# Patient Record
Sex: FEMALE | Race: BLACK OR AFRICAN AMERICAN | Employment: UNEMPLOYED | URBAN - METROPOLITAN AREA
[De-identification: names, ages, dates, MRNs, and addresses within clinical notes are randomized per-mention and may not be internally consistent; named-entity substitution may affect disease eponyms.]

---

## 2018-06-23 ENCOUNTER — HOSPITAL ENCOUNTER (EMERGENCY)
Age: 33
Discharge: HOME | End: 2018-06-23
Attending: EMERGENCY MEDICINE
Payer: SELF-PAY

## 2018-06-23 ENCOUNTER — APPOINTMENT (OUTPATIENT)
Dept: GENERAL RADIOLOGY | Age: 33
End: 2018-06-23
Attending: EMERGENCY MEDICINE
Payer: SELF-PAY

## 2018-06-23 VITALS
WEIGHT: 190 LBS | OXYGEN SATURATION: 100 % | BODY MASS INDEX: 34.96 KG/M2 | HEIGHT: 62 IN | SYSTOLIC BLOOD PRESSURE: 121 MMHG | HEART RATE: 70 BPM | DIASTOLIC BLOOD PRESSURE: 64 MMHG | RESPIRATION RATE: 16 BRPM | TEMPERATURE: 98 F

## 2018-06-23 DIAGNOSIS — R07.89 MUSCULOSKELETAL CHEST PAIN: ICD-10-CM

## 2018-06-23 DIAGNOSIS — R07.89 ATYPICAL CHEST PAIN: Primary | ICD-10-CM

## 2018-06-23 LAB
ALBUMIN SERPL-MCNC: 3.8 G/DL (ref 3.5–5)
ALBUMIN/GLOB SERPL: 0.9 {RATIO} (ref 1.2–3.5)
ALP SERPL-CCNC: 129 U/L (ref 50–136)
ALT SERPL-CCNC: 19 U/L (ref 12–65)
ANION GAP SERPL CALC-SCNC: 9 MMOL/L (ref 7–16)
AST SERPL-CCNC: 18 U/L (ref 15–37)
BASOPHILS # BLD: 0 K/UL (ref 0–0.2)
BASOPHILS NFR BLD: 0 % (ref 0–2)
BILIRUB SERPL-MCNC: 0.5 MG/DL (ref 0.2–1.1)
BUN SERPL-MCNC: 10 MG/DL (ref 6–23)
CALCIUM SERPL-MCNC: 8.9 MG/DL (ref 8.3–10.4)
CHLORIDE SERPL-SCNC: 106 MMOL/L (ref 98–107)
CO2 SERPL-SCNC: 24 MMOL/L (ref 21–32)
CREAT SERPL-MCNC: 0.95 MG/DL (ref 0.6–1)
DIFFERENTIAL METHOD BLD: ABNORMAL
EOSINOPHIL # BLD: 0.1 K/UL (ref 0–0.8)
EOSINOPHIL NFR BLD: 1 % (ref 0.5–7.8)
ERYTHROCYTE [DISTWIDTH] IN BLOOD BY AUTOMATED COUNT: 12.9 % (ref 11.9–14.6)
GLOBULIN SER CALC-MCNC: 4.3 G/DL (ref 2.3–3.5)
GLUCOSE SERPL-MCNC: 97 MG/DL (ref 65–100)
HCG UR QL: NEGATIVE
HCT VFR BLD AUTO: 38.9 % (ref 35.8–46.3)
HGB BLD-MCNC: 13.8 G/DL (ref 11.7–15.4)
IMM GRANULOCYTES # BLD: 0 K/UL (ref 0–0.5)
IMM GRANULOCYTES NFR BLD AUTO: 0 % (ref 0–5)
LYMPHOCYTES # BLD: 4.7 K/UL (ref 0.5–4.6)
LYMPHOCYTES NFR BLD: 47 % (ref 13–44)
MCH RBC QN AUTO: 31.4 PG (ref 26.1–32.9)
MCHC RBC AUTO-ENTMCNC: 35.5 G/DL (ref 31.4–35)
MCV RBC AUTO: 88.6 FL (ref 79.6–97.8)
MONOCYTES # BLD: 0.8 K/UL (ref 0.1–1.3)
MONOCYTES NFR BLD: 7 % (ref 4–12)
NEUTS SEG # BLD: 4.7 K/UL (ref 1.7–8.2)
NEUTS SEG NFR BLD: 45 % (ref 43–78)
PLATELET # BLD AUTO: 258 K/UL (ref 150–450)
PMV BLD AUTO: 10.6 FL (ref 10.8–14.1)
POTASSIUM SERPL-SCNC: 3.8 MMOL/L (ref 3.5–5.1)
PROT SERPL-MCNC: 8.1 G/DL (ref 6.3–8.2)
RBC # BLD AUTO: 4.39 M/UL (ref 4.05–5.25)
SODIUM SERPL-SCNC: 139 MMOL/L (ref 136–145)
TROPONIN I SERPL-MCNC: <0.02 NG/ML (ref 0.02–0.05)
WBC # BLD AUTO: 10.3 K/UL (ref 4.3–11.1)

## 2018-06-23 PROCEDURE — 81003 URINALYSIS AUTO W/O SCOPE: CPT | Performed by: EMERGENCY MEDICINE

## 2018-06-23 PROCEDURE — 74011000250 HC RX REV CODE- 250: Performed by: EMERGENCY MEDICINE

## 2018-06-23 PROCEDURE — 74011250637 HC RX REV CODE- 250/637: Performed by: EMERGENCY MEDICINE

## 2018-06-23 PROCEDURE — 80053 COMPREHEN METABOLIC PANEL: CPT | Performed by: EMERGENCY MEDICINE

## 2018-06-23 PROCEDURE — 84484 ASSAY OF TROPONIN QUANT: CPT | Performed by: EMERGENCY MEDICINE

## 2018-06-23 PROCEDURE — 71045 X-RAY EXAM CHEST 1 VIEW: CPT

## 2018-06-23 PROCEDURE — 93005 ELECTROCARDIOGRAM TRACING: CPT | Performed by: EMERGENCY MEDICINE

## 2018-06-23 PROCEDURE — 85025 COMPLETE CBC W/AUTO DIFF WBC: CPT | Performed by: EMERGENCY MEDICINE

## 2018-06-23 PROCEDURE — 81025 URINE PREGNANCY TEST: CPT

## 2018-06-23 PROCEDURE — 99285 EMERGENCY DEPT VISIT HI MDM: CPT | Performed by: EMERGENCY MEDICINE

## 2018-06-23 RX ORDER — GUAIFENESIN 100 MG/5ML
162 LIQUID (ML) ORAL
Status: COMPLETED | OUTPATIENT
Start: 2018-06-23 | End: 2018-06-23

## 2018-06-23 RX ORDER — RANITIDINE 150 MG/1
150 TABLET, FILM COATED ORAL 2 TIMES DAILY
COMMUNITY
End: 2018-07-13 | Stop reason: ALTCHOICE

## 2018-06-23 RX ORDER — LIDOCAINE HYDROCHLORIDE 20 MG/ML
15 SOLUTION OROPHARYNGEAL
Status: COMPLETED | OUTPATIENT
Start: 2018-06-23 | End: 2018-06-23

## 2018-06-23 RX ORDER — HYOSCYAMINE SULFATE 0.38 MG/1
375 TABLET, EXTENDED RELEASE ORAL
Qty: 60 TAB | Refills: 1 | Status: SHIPPED | OUTPATIENT
Start: 2018-06-23 | End: 2018-07-13 | Stop reason: ALTCHOICE

## 2018-06-23 RX ORDER — TRAMADOL HYDROCHLORIDE 50 MG/1
50-100 TABLET ORAL
Qty: 20 TAB | Refills: 0 | Status: SHIPPED | OUTPATIENT
Start: 2018-06-23 | End: 2018-07-13 | Stop reason: ALTCHOICE

## 2018-06-23 RX ADMIN — ASPIRIN 81 MG 162 MG: 81 TABLET ORAL at 20:03

## 2018-06-23 RX ADMIN — Medication 30 ML: at 20:03

## 2018-06-23 RX ADMIN — LIDOCAINE HYDROCHLORIDE 15 ML: 20 SOLUTION ORAL; TOPICAL at 20:03

## 2018-06-23 NOTE — ED PROVIDER NOTES
Patient is a 34 y.o. female presenting with chest pain. The history is provided by the patient. Chest Pain    This is a new problem. Episode onset: 3-4 hours ago. The problem has not changed since onset. The problem occurs constantly. The pain is associated with normal activity. Pain location: Substernal and slightly parasternal. The pain is mild. The quality of the pain is described as pressure-like. The pain does not radiate. Associated symptoms include cough (Slight, nonproductive) and shortness of breath. Pertinent negatives include no abdominal pain, no back pain, no diaphoresis, no dizziness, no exertional chest pressure, no fever, no headaches, no nausea, no palpitations and no vomiting. She has tried nothing for the symptoms. Risk factors include smoking/tobacco exposure (Patient quit smoking 3 weeks ago). Her past medical history does not include DM or HTN. Pertinent negatives include no exercise treadmill test.       Past Medical History:   Diagnosis Date    Miscarriage     at 4 weeks       Past Surgical History:   Procedure Laterality Date    HX GYN      D&C         Family History:   Problem Relation Age of Onset    Coronary Artery Disease Mother        Social History     Social History    Marital status:      Spouse name: N/A    Number of children: N/A    Years of education: N/A     Occupational History    Not on file. Social History Main Topics    Smoking status: Current Every Day Smoker     Packs/day: 1.00     Years: 20.00     Last attempt to quit: 11/1/2016    Smokeless tobacco: Never Used    Alcohol use Yes      Comment: sometimes    Drug use: Yes     Special: Cocaine      Comment: last use 3 weeks ago    Sexual activity: Yes     Partners: Male     Birth control/ protection: None     Other Topics Concern    Not on file     Social History Narrative         ALLERGIES: Review of patient's allergies indicates no known allergies.     Review of Systems   Constitutional: Negative for chills, diaphoresis and fever. HENT: Negative for rhinorrhea and sore throat. Eyes: Negative for discharge and redness. Respiratory: Positive for cough (Slight, nonproductive) and shortness of breath. Cardiovascular: Positive for chest pain. Negative for palpitations. Gastrointestinal: Negative for abdominal pain, nausea and vomiting. Genitourinary: Negative for difficulty urinating and dysuria. Musculoskeletal: Negative for arthralgias and back pain. Skin: Negative for rash. Neurological: Negative for dizziness and headaches. All other systems reviewed and are negative. Vitals:    06/23/18 1903 06/23/18 1924 06/23/18 1933   BP: (!) 129/95 127/68    Pulse: 73 68    Resp: 16 20    Temp: 98 °F (36.7 °C)     SpO2: 100% 100% 100%   Weight: 86.2 kg (190 lb)     Height: 5' 2\" (1.575 m)              Physical Exam   Constitutional: She is oriented to person, place, and time. She appears well-developed and well-nourished. No distress. HENT:   Head: Normocephalic and atraumatic. Eyes: Conjunctivae are normal. Pupils are equal, round, and reactive to light. Right eye exhibits no discharge. Left eye exhibits no discharge. No scleral icterus. Neck: Normal range of motion. Neck supple. Cardiovascular: Normal rate, regular rhythm and normal heart sounds. Exam reveals no gallop. No murmur heard. Pulmonary/Chest: Effort normal and breath sounds normal. No respiratory distress. She has no wheezes. She has no rales. She exhibits tenderness. Abdominal: Soft. Bowel sounds are normal. There is no tenderness. There is no guarding. Musculoskeletal: Normal range of motion. She exhibits no edema. Neurological: She is alert and oriented to person, place, and time. She exhibits normal muscle tone. cni 2-12 grossly   Skin: Skin is warm and dry. She is not diaphoretic. Psychiatric: She has a normal mood and affect. Her behavior is normal.   Nursing note and vitals reviewed.        MDM  Number of Diagnoses or Management Options  Diagnosis management comments: Medical decision making note:  Atypical chest pain and normal EKG and normal labs  Reproducible by palpation, slightly reproduced by deep inspiration. Long history of reflux, went to an urgent care recently where she was placed on Zantac here in the last week or so. Denies water brash or heartburn symptoms, chest pain, most possible this is a lower level reflex rotating her esophagus without giving her the taste of acid, thanks to the Zantac. .  Trial of GI cocktail, if no improvement will treat for musculoskeletal chest pain and lightheadedness reproducibility with palpation, more so than with inspiratory effort. This concludes the \"medical decision making note\" part of this emergency department visit note.           ED Course       Procedures

## 2018-06-24 LAB
ATRIAL RATE: 71 BPM
CALCULATED P AXIS, ECG09: 67 DEGREES
CALCULATED R AXIS, ECG10: 29 DEGREES
CALCULATED T AXIS, ECG11: 34 DEGREES
DIAGNOSIS, 93000: NORMAL
P-R INTERVAL, ECG05: 160 MS
Q-T INTERVAL, ECG07: 398 MS
QRS DURATION, ECG06: 68 MS
QTC CALCULATION (BEZET), ECG08: 432 MS
VENTRICULAR RATE, ECG03: 71 BPM

## 2018-06-24 NOTE — DISCHARGE INSTRUCTIONS
Stop the Zantac  Start Prilosec 20 cc, once a day  Ultram as needed for chest wall pain  Levbid, twice a day as needed for stomach cramps or esophagus pain    Return to the ER if worse in any way       Chest Pain: Care Instructions  Your Care Instructions    There are many things that can cause chest pain. Some are not serious and will get better on their own in a few days. But some kinds of chest pain need more testing and treatment. Your doctor may have recommended a follow-up visit in the next 8 to 12 hours. If you are not getting better, you may need more tests or treatment. Even though your doctor has released you, you still need to watch for any problems. The doctor carefully checked you, but sometimes problems can develop later. If you have new symptoms or if your symptoms do not get better, get medical care right away. If you have worse or different chest pain or pressure that lasts more than 5 minutes or you passed out (lost consciousness), call 911 or seek other emergency help right away. A medical visit is only one step in your treatment. Even if you feel better, you still need to do what your doctor recommends, such as going to all suggested follow-up appointments and taking medicines exactly as directed. This will help you recover and help prevent future problems. How can you care for yourself at home? · Rest until you feel better. · Take your medicine exactly as prescribed. Call your doctor if you think you are having a problem with your medicine. · Do not drive after taking a prescription pain medicine. When should you call for help? Call 911 if:  ? · You passed out (lost consciousness). ? · You have severe difficulty breathing. ? · You have symptoms of a heart attack. These may include:  ¨ Chest pain or pressure, or a strange feeling in your chest.  ¨ Sweating. ¨ Shortness of breath. ¨ Nausea or vomiting.   ¨ Pain, pressure, or a strange feeling in your back, neck, jaw, or upper belly or in one or both shoulders or arms. ¨ Lightheadedness or sudden weakness. ¨ A fast or irregular heartbeat. After you call 911, the  may tell you to chew 1 adult-strength or 2 to 4 low-dose aspirin. Wait for an ambulance. Do not try to drive yourself. ?Call your doctor today if:  ? · You have any trouble breathing. ? · Your chest pain gets worse. ? · You are dizzy or lightheaded, or you feel like you may faint. ? · You are not getting better as expected. ? · You are having new or different chest pain. Where can you learn more? Go to http://walt-jesus.info/. Enter A120 in the search box to learn more about \"Chest Pain: Care Instructions. \"  Current as of: March 20, 2017  Content Version: 11.4  © 0010-3800 [employer]. Care instructions adapted under license by [employer] (which disclaims liability or warranty for this information). If you have questions about a medical condition or this instruction, always ask your healthcare professional. [address] any warranty or liability for your use of this information.

## 2018-06-24 NOTE — ED NOTES
I have reviewed discharge instructions with the patient. The patient verbalized understanding. Patient left ED via Discharge Method: ambulatory to Home with friend. Opportunity for questions and clarification provided. Patient given 2 scripts. To continue your aftercare when you leave the hospital, you may receive an automated call from our care team to check in on how you are doing. This is a free service and part of our promise to provide the best care and service to meet your aftercare needs.  If you have questions, or wish to unsubscribe from this service please call [phone]. Thank you for Choosing our [facility] Emergency Department.  l

## 2018-08-04 ENCOUNTER — HOSPITAL ENCOUNTER (EMERGENCY)
Age: 33
Discharge: HOME | End: 2018-08-04
Attending: EMERGENCY MEDICINE
Payer: SELF-PAY

## 2018-08-04 ENCOUNTER — APPOINTMENT (OUTPATIENT)
Dept: GENERAL RADIOLOGY | Age: 33
End: 2018-08-04
Attending: EMERGENCY MEDICINE
Payer: SELF-PAY

## 2018-08-04 ENCOUNTER — APPOINTMENT (OUTPATIENT)
Dept: ULTRASOUND IMAGING | Age: 33
End: 2018-08-04
Attending: EMERGENCY MEDICINE
Payer: SELF-PAY

## 2018-08-04 VITALS
HEART RATE: 68 BPM | RESPIRATION RATE: 16 BRPM | TEMPERATURE: 98.4 F | SYSTOLIC BLOOD PRESSURE: 122 MMHG | OXYGEN SATURATION: 100 % | WEIGHT: 190 LBS | BODY MASS INDEX: 33.66 KG/M2 | HEIGHT: 63 IN | DIASTOLIC BLOOD PRESSURE: 74 MMHG

## 2018-08-04 DIAGNOSIS — M79.10 MYALGIA: Primary | ICD-10-CM

## 2018-08-04 PROCEDURE — 73090 X-RAY EXAM OF FOREARM: CPT

## 2018-08-04 PROCEDURE — 93971 EXTREMITY STUDY: CPT

## 2018-08-04 PROCEDURE — 99283 EMERGENCY DEPT VISIT LOW MDM: CPT | Performed by: EMERGENCY MEDICINE

## 2018-08-04 RX ORDER — NAPROXEN 500 MG/1
500 TABLET ORAL 2 TIMES DAILY WITH MEALS
Qty: 20 TAB | Refills: 0 | Status: SHIPPED | OUTPATIENT
Start: 2018-08-04 | End: 2018-08-14

## 2018-08-04 NOTE — ED TRIAGE NOTES
Pt complains of pain from her elbow to her wrist on the right side. States it feels like pins and needles and states it goes numb when she sleeps. Reports this has been going on for about a month. Pt denies injury to the arm. Peripheral pulse is strong and intact.

## 2018-08-05 NOTE — DISCHARGE INSTRUCTIONS
Muscle Aches: Care Instructions  Your Care Instructions    Muscle aches have many possible causes. Some common ones are overuse, tension, and injuries such as a strained muscle. An infection such as the flu can cause muscle aches. Or the aches may be caused by some medicines, such as antipsychotics. Muscle aches may also be a symptom of a disease like lupus or fibromyalgia. Myalgia is the medical term for muscle aches. The doctor will do a physical exam and ask questions to try to find what is causing your pain. You may also have tests such as blood tests or imaging tests like X-rays. These can help find or rule out serious problems. The doctor has checked you carefully, but problems can develop later. If you notice any problems or new symptoms, get medical treatment right away. Follow-up care is a key part of your treatment and safety. Be sure to make and go to all appointments, and call your doctor if you are having problems. It's also a good idea to know your test results and keep a list of the medicines you take. How can you care for yourself at home? · Rest the area that hurts. You may need to stop or reduce the activity that causes your symptoms. Then you can return to it slowly. · Put ice or a cold pack on the area for 10 to 20 minutes at a time to ease pain. Put a thin cloth between the ice and your skin. · Take an over-the-counter pain medicine, such as acetaminophen (Tylenol), ibuprofen (Advil, Motrin), or naproxen (Aleve). Be safe with medicines. Read and follow all instructions on the label. When should you call for help? Call your doctor now or seek immediate medical care if:    · Your pain gets worse.     · You have new symptoms, such as a fever, swelling, or a rash.    Watch closely for changes in your health, and be sure to contact your doctor if:    · You do not get better as expected. Where can you learn more? Go to http://walt-jesus.info/.   Enter G355 in the search box to learn more about \"Muscle Aches: Care Instructions. \"  Current as of: October 9, 2017  Content Version: 11.7  © 7542-8453 [employer]. Care instructions adapted under license by [employer] (which disclaims liability or warranty for this information). If you have questions about a medical condition or this instruction, always ask your healthcare professional. [place] [address] any warranty or liability for your use of this information.

## 2018-08-05 NOTE — ED PROVIDER NOTES
HPI Comments: 35 yo female with no past medical history presents to ED with ~ 1 month of right forearm pain. Patient describes \"pins and needles\" numbing type pain from her elbow to wrist.  The pain is constant with no modifying factors. She states the past 2 days the past has been worse. She also feels like her right forearm is swollen. She denies injury. No upper arm, shoulder, or neck pain. No arm weakness. No arm skin or temperature changes. No history of same. Patient is a 34 y.o. female presenting with arm pain. The history is provided by the patient. Arm Pain Associated symptoms include numbness. Past Medical History:  
Diagnosis Date  Miscarriage   
 at 4 weeks Past Surgical History:  
Procedure Laterality Date  HX GYN    
 D&C Family History:  
Problem Relation Age of Onset  Coronary Artery Disease Mother Social History Social History  Marital status:  Spouse name: N/A  
 Number of children: N/A  
 Years of education: N/A Occupational History  Not on file. Social History Main Topics  Smoking status: Current Every Day Smoker Packs/day: 1.00 Years: 20.00 Last attempt to quit: 11/1/2016  Smokeless tobacco: Never Used  Alcohol use Yes Comment: sometimes  Drug use: Yes Special: Cocaine Comment: last use 3 weeks ago  Sexual activity: Yes  
  Partners: Male Birth control/ protection: None Other Topics Concern  Not on file Social History Narrative ALLERGIES: Review of patient's allergies indicates no known allergies. Review of Systems Constitutional: Negative. HENT: Negative. Eyes: Negative. Respiratory: Negative. Cardiovascular: Negative. Gastrointestinal: Negative. Endocrine: Negative. Genitourinary: Negative. Musculoskeletal: Positive for myalgias. Skin: Negative. Allergic/Immunologic: Negative.    
Neurological: Positive for numbness. Hematological: Negative. Psychiatric/Behavioral: Negative. Vitals:  
 08/04/18 1959 BP: 119/81 Pulse: 70 Resp: 17 Temp: 98.3 °F (36.8 °C) SpO2: 100% Weight: 86.2 kg (190 lb) Height: 5' 3\" (1.6 m) Physical Exam  
Constitutional: She is oriented to person, place, and time. She appears well-developed and well-nourished. No distress. HENT:  
Head: Normocephalic and atraumatic. Eyes: Conjunctivae and EOM are normal.  
Neck: Normal range of motion. Neck supple. No tracheal deviation present. Cardiovascular: Normal rate, regular rhythm and intact distal pulses. Pulses: 
     Radial pulses are 2+ on the right side Pulmonary/Chest: Effort normal and breath sounds normal. No respiratory distress. Abdominal: Soft. There is no tenderness. Musculoskeletal: She exhibits no edema or deformity. Right generalized forearm with tenderness on palpation, no edema appreciated, normal ROM at elbow and wrist without difficulty Neurological: She is alert and oriented to person, place, and time. She has normal strength. No cranial nerve deficit or sensory deficit. No sensory or strength deficit noted in RUE Skin: Skin is warm and dry. No rash noted. She is not diaphoretic. No erythema. No pallor. Psychiatric: She has a normal mood and affect. MDM Number of Diagnoses or Management Options Myalgia:  
Diagnosis management comments: X-ray of right forearm and U/S of RUE negative. Patient with 1 month of right forearm pain. She is neurovascular intact here in ED. Low suspicion for emergent etiology. Will d/c home with NSAID, follow up with PMD and orthopedics as outpatient. ED Course Procedures

## 2018-08-05 NOTE — ED NOTES
I have reviewed discharge instructions with the patient. The patient verbalized understanding. Patient left ED via Discharge Method: ambulatory to Home with self. Opportunity for questions and clarification provided. Patient given 1 scripts. To continue your aftercare when you leave the hospital, you may receive an automated call from our care team to check in on how you are doing. This is a free service and part of our promise to provide the best care and service to meet your aftercare needs.  If you have questions, or wish to unsubscribe from this service please call [phone]. Thank you for Choosing our [facility] Emergency Department.

## 2019-05-25 ENCOUNTER — HOSPITAL ENCOUNTER (EMERGENCY)
Age: 34
Discharge: HOME | End: 2019-05-25
Attending: EMERGENCY MEDICINE
Payer: COMMERCIAL

## 2019-05-25 VITALS
RESPIRATION RATE: 18 BRPM | OXYGEN SATURATION: 99 % | SYSTOLIC BLOOD PRESSURE: 128 MMHG | WEIGHT: 191 LBS | HEART RATE: 74 BPM | BODY MASS INDEX: 33.84 KG/M2 | HEIGHT: 63 IN | TEMPERATURE: 98.3 F | DIASTOLIC BLOOD PRESSURE: 60 MMHG

## 2019-05-25 DIAGNOSIS — N76.0 ACUTE VAGINITIS: Primary | ICD-10-CM

## 2019-05-25 LAB
BACTERIA URNS QL MICRO: ABNORMAL /HPF
CASTS URNS QL MICRO: ABNORMAL /LPF
EPI CELLS #/AREA URNS HPF: ABNORMAL /HPF
HCG UR QL: NEGATIVE
RBC #/AREA URNS HPF: ABNORMAL /HPF
SERVICE CMNT-IMP: NORMAL
WBC URNS QL MICRO: >100 /HPF
WET PREP GENITAL: NORMAL

## 2019-05-25 PROCEDURE — 99284 EMERGENCY DEPT VISIT MOD MDM: CPT | Performed by: EMERGENCY MEDICINE

## 2019-05-25 PROCEDURE — 81003 URINALYSIS AUTO W/O SCOPE: CPT | Performed by: EMERGENCY MEDICINE

## 2019-05-25 PROCEDURE — 81025 URINE PREGNANCY TEST: CPT

## 2019-05-25 PROCEDURE — 87491 CHLMYD TRACH DNA AMP PROBE: CPT

## 2019-05-25 PROCEDURE — 74011250637 HC RX REV CODE- 250/637: Performed by: EMERGENCY MEDICINE

## 2019-05-25 PROCEDURE — 81015 MICROSCOPIC EXAM OF URINE: CPT

## 2019-05-25 PROCEDURE — 74011250636 HC RX REV CODE- 250/636: Performed by: EMERGENCY MEDICINE

## 2019-05-25 PROCEDURE — 87210 SMEAR WET MOUNT SALINE/INK: CPT

## 2019-05-25 RX ORDER — DOXYCYCLINE HYCLATE 100 MG
100 TABLET ORAL 2 TIMES DAILY
Qty: 14 TAB | Refills: 0 | Status: SHIPPED | OUTPATIENT
Start: 2019-05-25 | End: 2019-06-08

## 2019-05-25 RX ORDER — FLUCONAZOLE 150 MG/1
150 TABLET ORAL DAILY
Qty: 1 TAB | Refills: 0 | Status: SHIPPED | OUTPATIENT
Start: 2019-05-25 | End: 2019-05-26

## 2019-05-25 RX ORDER — AZITHROMYCIN 250 MG/1
1000 TABLET, FILM COATED ORAL
Status: COMPLETED | OUTPATIENT
Start: 2019-05-25 | End: 2019-05-25

## 2019-05-25 RX ADMIN — LIDOCAINE HYDROCHLORIDE 250 MG: 10 INJECTION, SOLUTION INFILTRATION; PERINEURAL at 07:20

## 2019-05-25 RX ADMIN — AZITHROMYCIN MONOHYDRATE 1000 MG: 250 TABLET ORAL at 07:19

## 2019-05-25 NOTE — ED PROVIDER NOTES
Patient presents for evaluation with a 24-hour history of vaginal discharge. She describes it as yellow thick and copious. She reports similar occurrence in the past associated with chlamydia infection. She also reports a history of previous gonorrhea infection. She states she is currently in a monogamous relationship. She denies any fever or chills or pelvic pain. The history is provided by the patient. Vaginal Discharge    This is a new problem. The current episode started 12 to 24 hours ago. The problem occurs constantly. The problem has not changed since onset. The discharge occurs spontaneously. The discharge was yellow and thick. She is not pregnant. Pertinent negatives include no fever, no nausea, no vomiting, no dyspareunia, no dysuria, no frequency and no genital burning. She has tried nothing for the symptoms. The treatment provided no relief.         Past Medical History:   Diagnosis Date    Miscarriage     at 4 weeks       Past Surgical History:   Procedure Laterality Date    HX GYN      D&C         Family History:   Problem Relation Age of Onset    Coronary Artery Disease Mother        Social History     Socioeconomic History    Marital status:      Spouse name: Not on file    Number of children: Not on file    Years of education: Not on file    Highest education level: Not on file   Occupational History    Not on file   Social Needs    Financial resource strain: Not on file    Food insecurity:     Worry: Not on file     Inability: Not on file    Transportation needs:     Medical: Not on file     Non-medical: Not on file   Tobacco Use    Smoking status: Current Every Day Smoker     Packs/day: 1.00     Years: 20.00     Pack years: 20.00     Last attempt to quit: 2016     Years since quittin.5    Smokeless tobacco: Never Used   Substance and Sexual Activity    Alcohol use: Yes     Comment: sometimes    Drug use: Yes     Types: Cocaine     Comment: last use 3 weeks ago    Sexual activity: Yes     Partners: Male     Birth control/protection: None   Lifestyle    Physical activity:     Days per week: Not on file     Minutes per session: Not on file    Stress: Not on file   Relationships    Social connections:     Talks on phone: Not on file     Gets together: Not on file     Attends Judaism service: Not on file     Active member of club or organization: Not on file     Attends meetings of clubs or organizations: Not on file     Relationship status: Not on file    Intimate partner violence:     Fear of current or ex partner: Not on file     Emotionally abused: Not on file     Physically abused: Not on file     Forced sexual activity: Not on file   Other Topics Concern    Not on file   Social History Narrative    Not on file         ALLERGIES: Patient has no known allergies. Review of Systems   Constitutional: Negative for fever. Gastrointestinal: Negative for nausea and vomiting. Genitourinary: Positive for vaginal discharge. Negative for dyspareunia, dysuria and frequency. All other systems reviewed and are negative. Vitals:    05/25/19 0556 05/25/19 0621   BP: 122/60    Pulse: 78    Resp: 17    Temp: 98.4 °F (36.9 °C)    SpO2: 98% 100%   Weight: 86.6 kg (191 lb)    Height: 5' 3\" (1.6 m)             Physical Exam   Constitutional: She is oriented to person, place, and time. She appears well-developed and well-nourished. No distress. HENT:   Head: Normocephalic and atraumatic. Eyes: Pupils are equal, round, and reactive to light. Conjunctivae and EOM are normal.   Genitourinary: Vaginal discharge found. Genitourinary Comments: Negative cervical motion tenderness   Neurological: She is alert and oriented to person, place, and time. Skin: Skin is warm and dry. She is not diaphoretic. Psychiatric: She has a normal mood and affect. Her behavior is normal.   Nursing note and vitals reviewed.        MDM  Number of Diagnoses or Management Options  Acute vaginitis:      Amount and/or Complexity of Data Reviewed  Clinical lab tests: ordered and reviewed    Risk of Complications, Morbidity, and/or Mortality  Presenting problems: low  Diagnostic procedures: low  Management options: low    Patient Progress  Patient progress: stable         Procedures

## 2019-05-25 NOTE — ED NOTES
I have reviewed discharge instructions with the patient. The patient verbalized understanding. Patient left ED via Discharge Method: ambulatory to Home with self. Opportunity for questions and clarification provided. Patient given 2 scripts. To continue your aftercare when you leave the hospital, you may receive an automated call from our care team to check in on how you are doing. This is a free service and part of our promise to provide the best care and service to meet your aftercare needs.  If you have questions, or wish to unsubscribe from this service please call [phone]. Thank you for Choosing our [facility] Emergency Department.

## 2019-05-25 NOTE — ED TRIAGE NOTES
Pt complains of light yellow, milky vaginal discharge and itching since last night. Pt denies any other symptoms.

## 2019-05-25 NOTE — DISCHARGE INSTRUCTIONS
Patient Education        Vaginitis: Care Instructions  Your Care Instructions    Vaginitis is soreness or infection of the vagina. This common problem can cause itching and burning. And it can cause a change in vaginal discharge. Sometimes it can cause pain during sex. Vaginitis may be caused by bacteria, yeast, or other germs. Some infections that cause it are caught from a sexual partner. Bath products, spermicides, and douches can irritate the vagina too. Some women have this problem during and after menopause. A drop in estrogen levels during this time can cause dryness, soreness, and pain during sex. Your doctor can give you medicine to treat an infection. And home care may help you feel better. For certain types of infections, your sex partner must be treated too. Follow-up care is a key part of your treatment and safety. Be sure to make and go to all appointments, and call your doctor if you are having problems. It's also a good idea to know your test results and keep a list of the medicines you take. How can you care for yourself at home? · If your doctor prescribed antibiotics, take them as directed. Do not stop taking them just because you feel better. You need to take the full course of antibiotics. · Take your medicines exactly as prescribed. Call your doctor if you think you are having a problem with your medicine. · Do not eat or drink anything that has alcohol if you are taking metronidazole (Flagyl). · If you have a yeast infection, use over-the-counter products as your doctor tells you to. Or take medicine your doctor prescribes exactly as directed. · Wash your vaginal area daily with water. You also can use a mild, unscented soap if you want. · Do not use scented bath products. And do not use vaginal sprays or douches. · Put a washcloth soaked in cool water on the area to relieve itching. Or you can take cool baths.   · If you have dryness because of menopause, use estrogen cream or pills that your doctor prescribes. · Ask your doctor about when it is okay to have sex. · Use a personal lubricant before sex if you have dryness. Examples are Astroglide, K-Y Jelly, and Wet Lubricant Gel. · Ask your doctor if your sex partner also needs treatment. When should you call for help? Call your doctor now or seek immediate medical care if:    · You have a fever and pelvic pain.    Watch closely for changes in your health, and be sure to contact your doctor if:    · You have bleeding other than your period.     · You do not get better as expected. Where can you learn more? Go to http://walt-jesus.info/. Enter X662 in the search box to learn more about \"Vaginitis: Care Instructions. \"  Current as of: May 14, 2018  Content Version: 11.9  © 2594-3949 [employer]. Care instructions adapted under license by [employer] (which disclaims liability or warranty for this information). If you have questions about a medical condition or this instruction, always ask your healthcare professional. [address] any warranty or liability for your use of this information.

## 2019-05-29 LAB
C TRACH RRNA SPEC QL NAA+PROBE: NEGATIVE
N GONORRHOEA RRNA SPEC QL NAA+PROBE: NEGATIVE
SPECIMEN SOURCE: NORMAL

## 2022-05-13 PROBLEM — Z90.710 S/P HYSTERECTOMY: Status: ACTIVE | Noted: 2020-03-11

## 2022-08-15 ENCOUNTER — TELEMEDICINE (OUTPATIENT)
Dept: BEHAVIORAL/MENTAL HEALTH CLINIC | Age: 37
End: 2022-08-15

## 2022-08-15 DIAGNOSIS — F41.0 GENERALIZED ANXIETY DISORDER WITH PANIC ATTACKS: Primary | ICD-10-CM

## 2022-08-15 DIAGNOSIS — F51.01 PRIMARY INSOMNIA: ICD-10-CM

## 2022-08-15 DIAGNOSIS — F41.1 GENERALIZED ANXIETY DISORDER WITH PANIC ATTACKS: Primary | ICD-10-CM

## 2022-08-15 DIAGNOSIS — F33.41 RECURRENT MAJOR DEPRESSIVE DISORDER, IN PARTIAL REMISSION (HCC): ICD-10-CM

## 2022-08-15 PROCEDURE — 99442 PR PHYS/QHP TELEPHONE EVALUATION 11-20 MIN: CPT | Performed by: PSYCHIATRY & NEUROLOGY

## 2022-08-15 RX ORDER — METHOCARBAMOL 500 MG/1
TABLET, FILM COATED ORAL
COMMUNITY
Start: 2022-07-12 | End: 2022-09-14

## 2022-08-15 RX ORDER — GUAIFENESIN 600 MG/1
600 TABLET, EXTENDED RELEASE ORAL 2 TIMES DAILY
COMMUNITY
Start: 2022-07-31 | End: 2022-09-14

## 2022-08-15 RX ORDER — AMOXICILLIN AND CLAVULANATE POTASSIUM 875; 125 MG/1; MG/1
TABLET, FILM COATED ORAL
COMMUNITY
Start: 2022-08-08 | End: 2022-09-14

## 2022-08-15 RX ORDER — FLUTICASONE PROPIONATE 50 MCG
SPRAY, SUSPENSION (ML) NASAL
COMMUNITY
Start: 2022-07-31 | End: 2022-09-14

## 2022-08-15 RX ORDER — IBUPROFEN 800 MG/1
TABLET ORAL
COMMUNITY
Start: 2022-07-31 | End: 2022-08-15

## 2022-08-15 RX ORDER — FLUCONAZOLE 150 MG/1
TABLET ORAL
COMMUNITY
Start: 2022-08-08 | End: 2022-09-14

## 2022-08-15 RX ORDER — TRAZODONE HYDROCHLORIDE 50 MG/1
25-50 TABLET ORAL NIGHTLY
Qty: 30 TABLET | Refills: 2 | Status: SHIPPED | OUTPATIENT
Start: 2022-08-15 | End: 2022-09-14

## 2022-08-15 RX ORDER — CLONAZEPAM 1 MG/1
1 TABLET ORAL DAILY PRN
Qty: 30 TABLET | Refills: 2 | Status: SHIPPED | OUTPATIENT
Start: 2022-08-15 | End: 2022-11-13

## 2022-08-15 RX ORDER — CETIRIZINE HYDROCHLORIDE 10 MG/1
TABLET ORAL
COMMUNITY
Start: 2022-07-31 | End: 2022-09-14

## 2022-08-15 ASSESSMENT — ANXIETY QUESTIONNAIRES
3. WORRYING TOO MUCH ABOUT DIFFERENT THINGS: 3
GAD7 TOTAL SCORE: 12
IF YOU CHECKED OFF ANY PROBLEMS ON THIS QUESTIONNAIRE, HOW DIFFICULT HAVE THESE PROBLEMS MADE IT FOR YOU TO DO YOUR WORK, TAKE CARE OF THINGS AT HOME, OR GET ALONG WITH OTHER PEOPLE: SOMEWHAT DIFFICULT
7. FEELING AFRAID AS IF SOMETHING AWFUL MIGHT HAPPEN: 1
6. BECOMING EASILY ANNOYED OR IRRITABLE: 1
2. NOT BEING ABLE TO STOP OR CONTROL WORRYING: 3
5. BEING SO RESTLESS THAT IT IS HARD TO SIT STILL: 1
1. FEELING NERVOUS, ANXIOUS, OR ON EDGE: 2
4. TROUBLE RELAXING: 1

## 2022-08-15 ASSESSMENT — PATIENT HEALTH QUESTIONNAIRE - PHQ9
SUM OF ALL RESPONSES TO PHQ QUESTIONS 1-9: 5
9. THOUGHTS THAT YOU WOULD BE BETTER OFF DEAD, OR OF HURTING YOURSELF: 0
SUM OF ALL RESPONSES TO PHQ QUESTIONS 1-9: 5
4. FEELING TIRED OR HAVING LITTLE ENERGY: 0
3. TROUBLE FALLING OR STAYING ASLEEP: 2
SUM OF ALL RESPONSES TO PHQ9 QUESTIONS 1 & 2: 3
8. MOVING OR SPEAKING SO SLOWLY THAT OTHER PEOPLE COULD HAVE NOTICED. OR THE OPPOSITE, BEING SO FIGETY OR RESTLESS THAT YOU HAVE BEEN MOVING AROUND A LOT MORE THAN USUAL: 0
7. TROUBLE CONCENTRATING ON THINGS, SUCH AS READING THE NEWSPAPER OR WATCHING TELEVISION: 0
2. FEELING DOWN, DEPRESSED OR HOPELESS: 1
1. LITTLE INTEREST OR PLEASURE IN DOING THINGS: 2
SUM OF ALL RESPONSES TO PHQ QUESTIONS 1-9: 5
5. POOR APPETITE OR OVEREATING: 0
6. FEELING BAD ABOUT YOURSELF - OR THAT YOU ARE A FAILURE OR HAVE LET YOURSELF OR YOUR FAMILY DOWN: 0
SUM OF ALL RESPONSES TO PHQ QUESTIONS 1-9: 5
10. IF YOU CHECKED OFF ANY PROBLEMS, HOW DIFFICULT HAVE THESE PROBLEMS MADE IT FOR YOU TO DO YOUR WORK, TAKE CARE OF THINGS AT HOME, OR GET ALONG WITH OTHER PEOPLE: 1

## 2022-08-15 NOTE — PROGRESS NOTES
Patient:  Michele Longoria  Age:  39 y.o.  :  1985     SEX:  female MRN:  [id number]     RACE: Black /      SEEN:  [x]  PATIENT  []  SPOUSE []  OTHER:              PHQ-9  8/15/2022 2022 2022   Little interest or pleasure in doing things 2 - -   Little interest or pleasure in doing things - 1 1   Feeling down, depressed, or hopeless 1 - -   Trouble falling or staying asleep, or sleeping too much 2 - -   Trouble falling or staying asleep, or sleeping too much - - 1   Feeling tired or having little energy 0 - -   Feeling tired or having little energy - - 1   Poor appetite or overeating 0 - -   Poor appetite, weight loss, or overeating - - 0   Feeling bad about yourself - or that you are a failure or have let yourself or your family down 0 - -   Feeling bad about yourself - or that you are a failure or have let yourself or your family down - - 0   Trouble concentrating on things, such as reading the newspaper or watching television 0 - -   Trouble concentrating on things such as school, work, reading, or watching TV - - 0   Moving or speaking so slowly that other people could have noticed. Or the opposite - being so fidgety or restless that you have been moving around a lot more than usual 0 - -   Moving or speaking so slowly that other people could have noticed; or the opposite being so fidgety that others notice - - 0   Thoughts that you would be better off dead, or of hurting yourself in some way 0 - -   Thoughts of being better off dead, or hurting yourself in some way - - 0   PHQ-2 Score 3 - -   Total Score PHQ 2 - 2 2   PHQ-9 Total Score 5 - -   PHQ 9 Score - - 4   If you checked off any problems, how difficult have these problems made it for you to do your work, take care of things at home, or get along with other people?  1 - -   How difficult have these problems made it for you to do your work, take care of your home and get along with others - - Not difficult at all       PENG-7 SCREENING 8/15/2022 2/2/2022 10/13/2021   Feeling nervous, anxious, or on edge More than half the days - -   Not being able to stop or control worrying Nearly every day - -   Worrying too much about different things Nearly every day - -   Trouble relaxing Several days - -   Being so restless that it is hard to sit still Several days - -   Becoming easily annoyed or irritable Several days - -   Feeling afraid as if something awful might happen Several days - -   PENG-7 Total Score 12 - -   How difficult have these problems made it for you to do your work, take care of things at home, or get along with other people? Somewhat difficult Not difficult at all Not difficult at all   Feeling nervous, anxious, or on edge - Several days Several days   PENG-7 Total Score - 6 5        I was at home while conducting this encounter. Consent:  She and/or her healthcare decision maker is aware that this patient-initiated Telehealth encounter is a billable service, with coverage as determined by her insurance carrier. She is aware that she may receive a bill and has provided verbal consent to proceed: YesPatient identification was verified, and a caregiver was present when appropriate. The patient was located in a state where the provider was credentialed to provide care. This virtual visit was conducted telephone encounter only. -  I affirm this is a Patient Initiated Episode with an Established Patient who has not had a related appointment within my department in the past 7 days or scheduled within the next 24 hours. Note: this encounter is not billable if this call serves to triage the patient into an appointment for the relevant concern. Total Time: minutes: 11-20 minutes. Chief complaint:  Pt says she has been okay on Klonopin. Subjective:  Phone visit. States doing okay on Klonopin. Is off from work today. Mother has a doctor's appointment so she took off. Not sleeping good.   Takes melatonin but it does not work. Still  from her  and probably will get . She does not want to deal with his alcohol use. Gets along with her other family okay. Her mother states 2 miles away from her. Supportive psychotherapy provided. She denies suicidal ideation/homicidal ideations. Denies symptoms psychosis. Patient Active Problem List   Diagnosis    GERD (gastroesophageal reflux disease)    S/P hysterectomy     S/p hysterectomy    Denies palpitation,SOB, Chest pain, headaches. In no acute distress. MEDICATION REVIEW:    Current Medications:    Current Outpatient Medications   Medication Sig    amoxicillin-clavulanate (AUGMENTIN) 875-125 MG per tablet TAKE ONE TABLET BY MOUTH TWO (TWO) TIMES A DAY FOR SEVEN DAYS    cetirizine (ZYRTEC) 10 MG tablet TAKE 1 TABLET BY MOUTH EVERY DAY. fluticasone (FLONASE) 50 MCG/ACT nasal spray USE 1 SPRAY INTO EACH NOSTRIL DAILY    guaiFENesin (MUCINEX) 600 MG extended release tablet Take 600 mg by mouth in the morning and 600 mg before bedtime. methocarbamol (ROBAXIN) 500 MG tablet TAKE ONE TABLET (500 MG) BY MOUTH THREE (THREE) TIMES A DAY AS NEEDED FOR MUSCLE SPASMS    traZODone (DESYREL) 50 MG tablet Take 0.5-1 tablets by mouth nightly    clonazePAM (KLONOPIN) 1 MG tablet Take 1 tablet by mouth daily as needed for Anxiety for up to 90 days. ibuprofen (ADVIL;MOTRIN) 400 MG tablet Take 400 mg by mouth every 6 hours as needed    metroNIDAZOLE (FLAGYL) 500 MG tablet Take 500 mg by mouth as needed    omeprazole (PRILOSEC) 40 MG delayed release capsule Take 40 mg by mouth daily    fluconazole (DIFLUCAN) 150 MG tablet TAKE ONE PILL EVERY THREE DAYS (Patient not taking: Reported on 8/15/2022)     No current facility-administered medications for this visit.        Allergies   Allergen Reactions    Lorazepam Itching and Swelling       Past Medical History, Past Surgical History, Family history, Social History, and Medications were all reviewed with the patient today and updated as necessary.      Compliant with medication: Yes   Side effects from medications:  No     EXAMINATION  Musculoskeletal    GAIT AND STATION   [] WNL   [] RESTRICTED   [] UNSTEADY WALK        [] ABNORMAL   [] UNBALANCED         PSYCHIATRIC     GENERAL APPEARANCE:   []  WELL GROOMED []     DISHEVELED   []  UNKEMPT      []  UNUSUAL/BIZZARE    [] WNL       ATTITUDE:   [x] COOPERATIVE   [] GUARDED   [] SUSPICIOUS      [] HOSTILE                            BEHAVIOR:   [x] CALM   [] HYPERACTIVE   [] MANNERISMS      [] BIZZARE         SPEECH:   [x] NORMAL FOR CLIENT   [] SPONTANEOUS   [] SLURRED   [] WHISPERING      [] LOUD   [] PRESSURED   [] ARTICULATE       EYE CONTACT:   [] WNL   [] BLANK STARE   [] INTENSE      [] AVOIDANT         MOOD:   [] EUTHYMIC   [x] ANXIOUS   [] DEPRESSED      [] IRRITABLE   [] ANGRY   [] APATHETIC     AFFECT:   [] CONGRUENT WITH MOOD   [] FLAT   [] CONSTRICTED      [] INAPPROPRIATE   [] LABILE           THOUGHT PROCESS:   [x] LOGICAL/GOAL-DIRECTED   [] FOI   [] CIRCUMSANTIAL      [] INCOHERENT   [] TANGENTIAL   [] CONCRETE      [] PERSEVERATION           THOUGHT CONTENT:                DELUSIONS  [x] DENIES  [] GRANDIOSE  [] PERSECUTORY  [] Scientology  [] REFERENCE   HALLUCINATIONS  [x] DENIES  [] AUDITORY  [] VISUAL  [] OLFACTORY  [] TACTILE     [] GUSTATORY  [] SOMATIC         OBSESSIONS  [x] DENIES  [] PRESENT         SUICIDAL IDEATION  [] DENIES  [] PRESENT W/O PLAN  [] PRESENT W/ PLAN       HOMICIDAL IDEATION  [x] DENIES  [] PRESENT W/O PLAN  [] PRESENT W/ PLAN           JUDGEMENT:   [x] GOOD   [] FAIR   [] POOR   INSIGHT:   [x] GOOD   [] FAIR   [] POOR     COGNITION:           SENSORIUM:   [x] ALERT   [] CLOUDED   [] DROWSY     ORIENTATION:   [x] INTACT   [] TIME:  PLACE  PERSON   RECENT & REMOTE MEMORY:   [] NORMAL   [x] OTHER:                  ATTENTION:   [x] INTACT   [] MILD IMPAIRMENT   [] SEVERE IMPAIRMENT     CONCENTRATION:   [x] INTACT [] MILD IMPAIRMENT   [] SEVERE IMPAIRMENT     LANGUAGE:   [x] AVERAGE   [] ABOVE AVERAGE   [] BELOW AVERAGE     FUND OF KNOWLEDGE:   [] UNABLE TO ASSESS AT THIS TIME   [x] AVERAGE   [] ABOVE AVERAGE   [] BELOW AVERAGE      [] GOOD TO EXCELLENT KNOWLEDGE OF CURRENT EVENTS   [] POOR TO NO KNLEDGE OF CURRENT EVENTS           ABNORMAL MOVEMENTS:   [] NONE   [] TICS   [] TREMORS   [] BIZZARE      [] FACE   [] TRUNK   [] EXTREMETIES   [] GESTURES        SLEEP:   [] GOOD   [] FAIR   [x] POOR      MUSCLE STRENGTH AND TONE   [] WNL   [] ATROPHY   [] SPASTIC        [] FLACCID   [] COGWHEEL         Diagnoses/Impressions:    ICD-10-CM    1. Generalized anxiety disorder with panic attacks  F41.1     F41.0       2. Recurrent major depressive disorder, in partial remission ([facility] 75.)  F33.41       3.  Primary insomnia  F51.01           TREATMENT GOALS:  Symptom reduction, Medication adherence, maintain therapeutic gains    LABS/IMAGING:    []  Ordered [x]  Reviewed []  New Labs Ordered:     LAB  WBC   Date/Time Value Ref Range Status   02/23/2022 04:37 PM 10.4 3.4 - 10.8 x10E3/uL Final     Hemoglobin   Date/Time Value Ref Range Status   02/23/2022 04:37 PM 12.2 11.1 - 15.9 g/dL Final     Hematocrit   Date/Time Value Ref Range Status   02/23/2022 04:37 PM 36.0 34.0 - 46.6 % Final     Platelets   Date/Time Value Ref Range Status   02/23/2022 04:37  150 - 450 x10E3/uL Final     Sodium   Date/Time Value Ref Range Status   02/23/2022 04:37  134 - 144 mmol/L Final     Potassium   Date/Time Value Ref Range Status   02/23/2022 04:37 PM 4.3 3.5 - 5.2 mmol/L Final     Chloride   Date/Time Value Ref Range Status   02/23/2022 04:37  96 - 106 mmol/L Final     CO2   Date/Time Value Ref Range Status   02/23/2022 04:37 PM 22 20 - 29 mmol/L Final     BUN   Date/Time Value Ref Range Status   02/23/2022 04:37 PM 11 6 - 20 mg/dL Final     Magnesium   Date/Time Value Ref Range Status   02/23/2022 04:37 PM 1.8 1.6 - 2.3 mg/dL Final     ALT Date/Time Value Ref Range Status   02/23/2022 04:37 PM 11 0 - 32 IU/L Final     AST   Date/Time Value Ref Range Status   02/23/2022 04:37 PM 16 0 - 40 IU/L Final     TSH   Date/Time Value Ref Range Status   02/23/2022 04:37 PM 1.010 0.450 - 4.500 uIU/mL Final       Please refer to the lab tab in the epic and care everywhere for the most recent lab results. Plan:     [x]  Medication ordered: Trazodone and Klonopin to target depression, anxiety, insomnia. [x]  Medication education/counseling provided  Medication dosage and time to take, purpose/expected benefits/risks, common side effects, lab monitoring required and reason, expected length of treatment, risk of no treatment, effects on pregnancy/nursing, financial availability. Educated patient on  side effects/risks/benefits of meds including cardiac arrhythmias, suicidal ideations, orthostatic hypotension, serotonin syndrome, risk of duncan/hypomania from antidepressants, withdrawals from abrupt discontinuation of meds,  risk of bleeding, risk of seizures, addiction potential, memory impairment with long term use of benzos, respiratory depression, high blood pressure, dizziness, drowsiness, sedation , risk of falls, Risk & benefits discussed: including but not limited to possible off-label medication usage. [x] Follow MSE for sxs improvement     I have reviewed the patients controlled substance prescription history, as maintained in the [place] prescription monitoring program, so that the prescription(s) for a  controlled substance can be given. Recommendations and Referrals: Follow up with : MD, requires monitoring of response to medication, requires monitoring of medication side effects.     Time until next PMA:     Follow up with Mental Health Clinicians: psychotherapy interventions, improve level of functioning, monitoring to prevent decompensation /hospitalization, monitoring to maintain therapeutic gains, symptoms (resolving and controlled)           Psychotherapy note:                                __10_ Minutes of psychotherapy     [x]  Supportive psychotherapy, Patient discussed certain situational and personal stressors ongoing in her life at this time, weight management d/w the patient. Sleep hygiene d/w patient. Patient allowed to vent out her emotions. Scenarios were reviewed using role playing and CBT techniques in order to increase insight and decrease anxiety. []  Disposition planning      []  Dangerous and will not contract for safety in the community    **Pateint has been notified: They are to call 911 or go to their nearest E.R. if they are experiencing a medical emergency or suicidal ideations/homicidal ideations. **  All ancillary documentation entered reviewed by provider. PLEASE NOTE:  This document has been produced in part or whole using voice recognition software. Proofread however unrecognized errors in transcription may be present.         Mikhail Drummond MD

## 2022-08-22 RX ORDER — OMEPRAZOLE 40 MG/1
CAPSULE, DELAYED RELEASE ORAL
Qty: 90 CAPSULE | OUTPATIENT
Start: 2022-08-22

## 2022-09-13 LAB
ALBUMIN SERPL-MCNC: 3.3 G/DL (ref 3.5–5)
ALBUMIN/GLOB SERPL: 0.8 {RATIO} (ref 1.2–3.5)
ALP SERPL-CCNC: 89 U/L (ref 50–136)
ALT SERPL-CCNC: 14 U/L (ref 12–65)
ANION GAP SERPL CALC-SCNC: 5 MMOL/L (ref 4–13)
AST SERPL-CCNC: 14 U/L (ref 15–37)
BILIRUB SERPL-MCNC: 0.3 MG/DL (ref 0.2–1.1)
BUN SERPL-MCNC: 15 MG/DL (ref 6–23)
CALCIUM SERPL-MCNC: 8.6 MG/DL (ref 8.3–10.4)
CHLORIDE SERPL-SCNC: 109 MMOL/L (ref 101–110)
CO2 SERPL-SCNC: 26 MMOL/L (ref 21–32)
CREAT SERPL-MCNC: 1.02 MG/DL (ref 0.6–1)
ERYTHROCYTE [DISTWIDTH] IN BLOOD BY AUTOMATED COUNT: 12.8 % (ref 11.9–14.6)
GLOBULIN SER CALC-MCNC: 4 G/DL (ref 2.3–3.5)
GLUCOSE SERPL-MCNC: 104 MG/DL (ref 65–100)
HCT VFR BLD AUTO: 37.3 % (ref 35.8–46.3)
HGB BLD-MCNC: 12.7 G/DL (ref 11.7–15.4)
MCH RBC QN AUTO: 30.9 PG (ref 26.1–32.9)
MCHC RBC AUTO-ENTMCNC: 34 G/DL (ref 31.4–35)
MCV RBC AUTO: 90.8 FL (ref 79.6–97.8)
NRBC # BLD: 0 K/UL (ref 0–0.2)
PLATELET # BLD AUTO: 252 K/UL (ref 150–450)
PMV BLD AUTO: 10.5 FL (ref 9.4–12.3)
POTASSIUM SERPL-SCNC: 3.4 MMOL/L (ref 3.5–5.1)
PROT SERPL-MCNC: 7.3 G/DL (ref 6.3–8.2)
RBC # BLD AUTO: 4.11 M/UL (ref 4.05–5.2)
SODIUM SERPL-SCNC: 140 MMOL/L (ref 136–145)
TROPONIN I SERPL HS-MCNC: 3.1 PG/ML (ref 0–14)
WBC # BLD AUTO: 10.1 K/UL (ref 4.3–11.1)

## 2022-09-13 PROCEDURE — 84484 ASSAY OF TROPONIN QUANT: CPT

## 2022-09-13 PROCEDURE — 93005 ELECTROCARDIOGRAM TRACING: CPT | Performed by: EMERGENCY MEDICINE

## 2022-09-13 PROCEDURE — 96374 THER/PROPH/DIAG INJ IV PUSH: CPT

## 2022-09-13 PROCEDURE — 80053 COMPREHEN METABOLIC PANEL: CPT

## 2022-09-13 PROCEDURE — 99285 EMERGENCY DEPT VISIT HI MDM: CPT

## 2022-09-13 PROCEDURE — 85027 COMPLETE CBC AUTOMATED: CPT

## 2022-09-13 ASSESSMENT — PAIN - FUNCTIONAL ASSESSMENT: PAIN_FUNCTIONAL_ASSESSMENT: 0-10

## 2022-09-13 ASSESSMENT — PAIN SCALES - GENERAL: PAINLEVEL_OUTOF10: 9

## 2022-09-14 ENCOUNTER — HOSPITAL ENCOUNTER (EMERGENCY)
Dept: GENERAL RADIOLOGY | Age: 37
Discharge: HOME | End: 2022-09-17
Payer: COMMERCIAL

## 2022-09-14 ENCOUNTER — OFFICE VISIT (OUTPATIENT)
Dept: FAMILY MEDICINE CLINIC | Facility: CLINIC | Age: 37
End: 2022-09-14
Payer: COMMERCIAL

## 2022-09-14 ENCOUNTER — HOSPITAL ENCOUNTER (EMERGENCY)
Age: 37
Discharge: HOME | End: 2022-09-14
Attending: EMERGENCY MEDICINE
Payer: COMMERCIAL

## 2022-09-14 VITALS
TEMPERATURE: 98.2 F | OXYGEN SATURATION: 99 % | WEIGHT: 207 LBS | DIASTOLIC BLOOD PRESSURE: 79 MMHG | SYSTOLIC BLOOD PRESSURE: 111 MMHG | HEIGHT: 63 IN | BODY MASS INDEX: 36.68 KG/M2 | HEART RATE: 72 BPM

## 2022-09-14 VITALS
SYSTOLIC BLOOD PRESSURE: 105 MMHG | DIASTOLIC BLOOD PRESSURE: 63 MMHG | OXYGEN SATURATION: 98 % | HEART RATE: 69 BPM | WEIGHT: 200 LBS | BODY MASS INDEX: 35.44 KG/M2 | HEIGHT: 63 IN | TEMPERATURE: 97.9 F | RESPIRATION RATE: 18 BRPM

## 2022-09-14 DIAGNOSIS — M62.838 MUSCLE SPASM: ICD-10-CM

## 2022-09-14 DIAGNOSIS — M94.0 COSTOCHONDRITIS, ACUTE: ICD-10-CM

## 2022-09-14 DIAGNOSIS — E87.6 HYPOKALEMIA: Primary | ICD-10-CM

## 2022-09-14 DIAGNOSIS — R07.89 ATYPICAL CHEST PAIN: Primary | ICD-10-CM

## 2022-09-14 DIAGNOSIS — Z23 ENCOUNTER FOR IMMUNIZATION: ICD-10-CM

## 2022-09-14 LAB
EKG ATRIAL RATE: 73 BPM
EKG DIAGNOSIS: NORMAL
EKG P AXIS: 73 DEGREES
EKG P-R INTERVAL: 182 MS
EKG Q-T INTERVAL: 412 MS
EKG QRS DURATION: 72 MS
EKG QTC CALCULATION (BAZETT): 453 MS
EKG R AXIS: 51 DEGREES
EKG T AXIS: 44 DEGREES
EKG VENTRICULAR RATE: 73 BPM
TROPONIN I SERPL HS-MCNC: 3.1 PG/ML (ref 0–14)

## 2022-09-14 PROCEDURE — 84484 ASSAY OF TROPONIN QUANT: CPT

## 2022-09-14 PROCEDURE — 99214 OFFICE O/P EST MOD 30 MIN: CPT | Performed by: NURSE PRACTITIONER

## 2022-09-14 PROCEDURE — 90471 IMMUNIZATION ADMIN: CPT | Performed by: NURSE PRACTITIONER

## 2022-09-14 PROCEDURE — 90674 CCIIV4 VAC NO PRSV 0.5 ML IM: CPT | Performed by: NURSE PRACTITIONER

## 2022-09-14 PROCEDURE — 6370000000 HC RX 637 (ALT 250 FOR IP): Performed by: EMERGENCY MEDICINE

## 2022-09-14 PROCEDURE — 6360000002 HC RX W HCPCS: Performed by: EMERGENCY MEDICINE

## 2022-09-14 PROCEDURE — 96374 THER/PROPH/DIAG INJ IV PUSH: CPT

## 2022-09-14 PROCEDURE — 71046 X-RAY EXAM CHEST 2 VIEWS: CPT

## 2022-09-14 RX ORDER — MAGNESIUM HYDROXIDE/ALUMINUM HYDROXICE/SIMETHICONE 120; 1200; 1200 MG/30ML; MG/30ML; MG/30ML
30 SUSPENSION ORAL
Status: COMPLETED | OUTPATIENT
Start: 2022-09-14 | End: 2022-09-14

## 2022-09-14 RX ORDER — KETOROLAC TROMETHAMINE 15 MG/ML
15 INJECTION, SOLUTION INTRAMUSCULAR; INTRAVENOUS ONCE
Status: COMPLETED | OUTPATIENT
Start: 2022-09-14 | End: 2022-09-14

## 2022-09-14 RX ORDER — TIZANIDINE 2 MG/1
2 TABLET ORAL NIGHTLY PRN
Qty: 10 TABLET | Refills: 0 | Status: SHIPPED | OUTPATIENT
Start: 2022-09-14

## 2022-09-14 RX ORDER — NAPROXEN 500 MG/1
500 TABLET ORAL 2 TIMES DAILY
Qty: 30 TABLET | Refills: 0 | Status: SHIPPED | OUTPATIENT
Start: 2022-09-14

## 2022-09-14 RX ORDER — LIDOCAINE HYDROCHLORIDE 20 MG/ML
15 SOLUTION OROPHARYNGEAL
Status: COMPLETED | OUTPATIENT
Start: 2022-09-14 | End: 2022-09-14

## 2022-09-14 RX ORDER — OMEPRAZOLE 40 MG/1
40 CAPSULE, DELAYED RELEASE ORAL DAILY
Qty: 30 CAPSULE | Refills: 2 | Status: SHIPPED | OUTPATIENT
Start: 2022-09-14

## 2022-09-14 RX ADMIN — ALUMINUM HYDROXIDE, MAGNESIUM HYDROXIDE, DIMETHICONE 30 ML: 200; 200; 20 LIQUID ORAL at 01:24

## 2022-09-14 RX ADMIN — KETOROLAC TROMETHAMINE 15 MG: 15 INJECTION, SOLUTION INTRAMUSCULAR; INTRAVENOUS at 00:39

## 2022-09-14 RX ADMIN — LIDOCAINE HYDROCHLORIDE 15 ML: 20 SOLUTION ORAL; TOPICAL at 01:24

## 2022-09-14 ASSESSMENT — PATIENT HEALTH QUESTIONNAIRE - PHQ9
8. MOVING OR SPEAKING SO SLOWLY THAT OTHER PEOPLE COULD HAVE NOTICED. OR THE OPPOSITE, BEING SO FIGETY OR RESTLESS THAT YOU HAVE BEEN MOVING AROUND A LOT MORE THAN USUAL: 1
3. TROUBLE FALLING OR STAYING ASLEEP: 0
SUM OF ALL RESPONSES TO PHQ QUESTIONS 1-9: 9
1. LITTLE INTEREST OR PLEASURE IN DOING THINGS: 2
9. THOUGHTS THAT YOU WOULD BE BETTER OFF DEAD, OR OF HURTING YOURSELF: 0
6. FEELING BAD ABOUT YOURSELF - OR THAT YOU ARE A FAILURE OR HAVE LET YOURSELF OR YOUR FAMILY DOWN: 0
SUM OF ALL RESPONSES TO PHQ9 QUESTIONS 1 & 2: 4
2. FEELING DOWN, DEPRESSED OR HOPELESS: 2
7. TROUBLE CONCENTRATING ON THINGS, SUCH AS READING THE NEWSPAPER OR WATCHING TELEVISION: 1
SUM OF ALL RESPONSES TO PHQ QUESTIONS 1-9: 9
SUM OF ALL RESPONSES TO PHQ QUESTIONS 1-9: 9
4. FEELING TIRED OR HAVING LITTLE ENERGY: 3
5. POOR APPETITE OR OVEREATING: 0
SUM OF ALL RESPONSES TO PHQ QUESTIONS 1-9: 9
10. IF YOU CHECKED OFF ANY PROBLEMS, HOW DIFFICULT HAVE THESE PROBLEMS MADE IT FOR YOU TO DO YOUR WORK, TAKE CARE OF THINGS AT HOME, OR GET ALONG WITH OTHER PEOPLE: 1

## 2022-09-14 ASSESSMENT — ENCOUNTER SYMPTOMS
FACIAL SWELLING: 0
EYES NEGATIVE: 1
ABDOMINAL PAIN: 0
ALLERGIC/IMMUNOLOGIC NEGATIVE: 1
VOMITING: 0
RESPIRATORY NEGATIVE: 1
GASTROINTESTINAL NEGATIVE: 1
DIARRHEA: 0
SHORTNESS OF BREATH: 0
COUGH: 1

## 2022-09-14 NOTE — PROGRESS NOTES
History     Socioeconomic History    Marital status:      Spouse name: Not on file    Number of children: Not on file    Years of education: Not on file    Highest education level: Not on file   Occupational History    Not on file   Tobacco Use    Smoking status: Former     Packs/day: 1.00     Types: Cigarettes     Quit date: 2016     Years since quittin.8    Smokeless tobacco: Never   Vaping Use    Vaping Use: Never used   Substance and Sexual Activity    Alcohol use: Not Currently    Drug use: Not Currently     Types: Cocaine    Sexual activity: Yes     Birth control/protection: Surgical, None   Other Topics Concern    Not on file   Social History Narrative    Not on file     Social Determinants of Health     Financial Resource Strain: Not on file   Food Insecurity: Not on file   Transportation Needs: Not on file   Physical Activity: Not on file   Stress: Not on file   Social Connections: Not on file   Intimate Partner Violence: Not on file   Housing Stability: Not on file       OB History    No obstetric history on file.          Past Surgical History:   Procedure Laterality Date    DILATION AND CURETTAGE OF UTERUS      GYN      D&C    HYSTERECTOMY (CERVIX STATUS UNKNOWN)  2020    TUBAL LIGATION         Health Maintenance   Topic Date Due    Varicella vaccine (1 of 2 - 2-dose childhood series) Never done    HIV screen  Never done    DTaP/Tdap/Td vaccine (6 - Tdap) 2001    Flu vaccine (1) Never done    COVID-19 Vaccine (3 - Booster for Pfizer series) 2022    Depression Monitoring  08/15/2023    Diabetes screen  2025    Hepatitis B vaccine  Completed    Hib vaccine  Completed    Hepatitis C screen  Completed    Hepatitis A vaccine  Aged Out    Meningococcal (ACWY) vaccine  Aged Out    Pneumococcal 0-64 years Vaccine  Aged Out         Current Outpatient Medications:     omeprazole (PRILOSEC) 40 MG delayed release capsule, Take 1 capsule by mouth daily, Disp: 30 capsule, Rfl: 2 tiZANidine (ZANAFLEX) 2 MG tablet, Take 1 tablet by mouth nightly as needed (spasms), Disp: 10 tablet, Rfl: 0    clonazePAM (KLONOPIN) 1 MG tablet, Take 1 tablet by mouth daily as needed for Anxiety for up to 90 days. , Disp: 30 tablet, Rfl: 2    naproxen (NAPROSYN) 500 MG tablet, Take 1 tablet by mouth 2 times daily (Patient not taking: Reported on 9/14/2022), Disp: 30 tablet, Rfl: 0    cetirizine (ZYRTEC) 10 MG tablet, TAKE 1 TABLET BY MOUTH EVERY DAY., Disp: , Rfl:     fluconazole (DIFLUCAN) 150 MG tablet, TAKE ONE PILL EVERY THREE DAYS (Patient not taking: Reported on 8/15/2022), Disp: , Rfl:     fluticasone (FLONASE) 50 MCG/ACT nasal spray, USE 1 SPRAY INTO EACH NOSTRIL DAILY, Disp: , Rfl:     guaiFENesin (MUCINEX) 600 MG extended release tablet, Take 600 mg by mouth in the morning and 600 mg before bedtime. , Disp: , Rfl:     methocarbamol (ROBAXIN) 500 MG tablet, TAKE ONE TABLET (500 MG) BY MOUTH THREE (THREE) TIMES A DAY AS NEEDED FOR MUSCLE SPASMS, Disp: , Rfl:     Review of Systems   Constitutional: Negative. HENT: Negative. Eyes: Negative. Respiratory: Negative. Cardiovascular: Negative. Gastrointestinal: Negative. Endocrine: Negative. Genitourinary: Negative. Musculoskeletal:         Chest wall pain   Skin: Negative. Allergic/Immunologic: Negative. Neurological: Negative. Hematological: Negative. Psychiatric/Behavioral: Negative. Vitals:    09/14/22 1442   BP: 111/79   Pulse: 72   Temp: 98.2 °F (36.8 °C)   SpO2: 99%        Physical Exam  Constitutional:       Appearance: Normal appearance. HENT:      Head: Normocephalic. Nose: Nose normal.   Eyes:      Extraocular Movements: Extraocular movements intact. Pupils: Pupils are equal, round, and reactive to light. Cardiovascular:      Rate and Rhythm: Normal rate and regular rhythm. Pulmonary:      Effort: Pulmonary effort is normal.      Breath sounds: Normal breath sounds.    Abdominal:      General: Abdomen is flat. Palpations: Abdomen is soft. Musculoskeletal:         General: Tenderness (chest wall tenderness with palpation and deep inspiration) present. Cervical back: Normal range of motion and neck supple. Skin:     General: Skin is warm and dry. Neurological:      General: No focal deficit present. Mental Status: She is alert and oriented to person, place, and time. Psychiatric:         Mood and Affect: Mood normal.         Behavior: Behavior normal.          Assessment & Plan:    1. Hypokalemia  - Potassium; Future  - Potassium    2. Costochondritis, acute  - tiZANidine (ZANAFLEX) 2 MG tablet; Take 1 tablet by mouth nightly as needed (spasms)  Dispense: 10 tablet; Refill: 0  Continue Naprosyn bid prn pain    3. Muscle spasm  - tiZANidine (ZANAFLEX) 2 MG tablet; Take 1 tablet by mouth nightly as needed (spasms)  Dispense: 10 tablet; Refill: 0    4. Encounter for immunization  - Influenza, FLUCELVAX, (age 10 mo+), IM, Preservative Free, 0.5 mL      Greater than 50% counseling and/or coordination of care: the treatment regimen is extensive; detailed review. Will notify of lab. Recheck in 3 months, sooner for issues. This note was dictated using dragon voice recognition software. It has been proofread, but there may still exist voice recognition errors that the author did not detect.       Signed By: BAL Ibanez - CNP     September 14, 2022

## 2022-09-14 NOTE — ED TRIAGE NOTES
Pt reports chest pain starting this morning when waking up. Pt states the pains are sharp and shooting. Denies any other sx, denies past cardiac history.  Pt endorses hx of anxiety, pt states she took a Klonopin around 8pm.

## 2022-09-14 NOTE — ED NOTES
I have reviewed discharge instructions with the patient. The patient verbalized understanding. Patient left ED via Discharge Method: ambulatory to Home with spouse. Opportunity for questions and clarification provided. Patient given 1 scripts. To continue your aftercare when you leave the hospital, you may receive an automated call from our care team to check in on how you are doing. This is a free service and part of our promise to provide the best care and service to meet your aftercare needs.  If you have questions, or wish to unsubscribe from this service please call [phone]. Thank you for Choosing our [facility] Emergency Department.         Brent Grigsby RN  09/14/22 3130

## 2022-09-15 LAB — POTASSIUM SERPL-SCNC: 4 MMOL/L (ref 3.5–5.1)

## 2022-11-21 RX ORDER — OMEPRAZOLE 40 MG/1
CAPSULE, DELAYED RELEASE ORAL
Qty: 30 CAPSULE | Refills: 2 | OUTPATIENT
Start: 2022-11-21

## 2022-12-05 RX ORDER — OMEPRAZOLE 40 MG/1
40 CAPSULE, DELAYED RELEASE ORAL DAILY
Qty: 30 CAPSULE | Refills: 2 | OUTPATIENT
Start: 2022-12-05

## 2022-12-15 ENCOUNTER — NURSE TRIAGE (OUTPATIENT)
Dept: OTHER | Facility: CLINIC | Age: 37
End: 2022-12-15

## 2022-12-15 NOTE — TELEPHONE ENCOUNTER
Location of patient: [place]    Received call from Agusto Lloyd at [employer] with [employer]. Subjective: Caller states \"I started a new job Monday and I stand on my feet all day. My L foot started hurting. The second day I could barely walk. My hands and feet are swollen. I also have a knot on the side of my L foot and I don't know where it came from. \"     Current Symptoms: hand and feet swelling     Onset: 4 days ago; sudden    Associated Symptoms:  denies numbness, tingling or discoloration    Pain Severity: 8/10; dull, aching; constant    Temperature: NA     What has been tried: ibuprofen    LMP: NA Pregnant:  hysterectomy    Recommended disposition: Go to [facility]    Care advice provided, patient verbalizes understanding; denies any other questions or concerns; instructed to call back for any new or worsening symptoms. Patient/Caller agrees with recommended disposition; writer provided warm transfer to [facility] at [employer] for appointment scheduling    Attention Provider: Thank you for allowing me to participate in the care of your patient. The patient was connected to triage in response to information provided to the ECC/PSC. Please do not respond through this encounter as the response is not directed to a shared pool.     Reason for Disposition   Swelling of face, arm or hands  (Exception: Slight puffiness of fingers during hot weather.)    Protocols used: Leg Swelling and Edema-ADULT-[place]

## 2022-12-20 ENCOUNTER — OFFICE VISIT (OUTPATIENT)
Dept: FAMILY MEDICINE CLINIC | Facility: CLINIC | Age: 37
End: 2022-12-20
Payer: COMMERCIAL

## 2022-12-20 ENCOUNTER — HOSPITAL ENCOUNTER (OUTPATIENT)
Dept: GENERAL RADIOLOGY | Age: 37
Discharge: HOME | End: 2022-12-23
Payer: COMMERCIAL

## 2022-12-20 VITALS
HEART RATE: 91 BPM | TEMPERATURE: 98.8 F | BODY MASS INDEX: 36.96 KG/M2 | SYSTOLIC BLOOD PRESSURE: 102 MMHG | DIASTOLIC BLOOD PRESSURE: 73 MMHG | OXYGEN SATURATION: 96 % | WEIGHT: 208.6 LBS | HEIGHT: 63 IN

## 2022-12-20 DIAGNOSIS — M25.472 LEFT ANKLE SWELLING: ICD-10-CM

## 2022-12-20 DIAGNOSIS — R53.83 OTHER FATIGUE: ICD-10-CM

## 2022-12-20 DIAGNOSIS — R60.9 EDEMA, UNSPECIFIED TYPE: Primary | ICD-10-CM

## 2022-12-20 DIAGNOSIS — R60.9 EDEMA, UNSPECIFIED TYPE: ICD-10-CM

## 2022-12-20 DIAGNOSIS — R06.00 DYSPNEA, UNSPECIFIED TYPE: ICD-10-CM

## 2022-12-20 LAB
BASOPHILS # BLD: 0 K/UL (ref 0–0.2)
BASOPHILS NFR BLD: 1 % (ref 0–2)
DIFFERENTIAL METHOD BLD: NORMAL
EOSINOPHIL # BLD: 0.1 K/UL (ref 0–0.8)
EOSINOPHIL NFR BLD: 1 % (ref 0.5–7.8)
ERYTHROCYTE [DISTWIDTH] IN BLOOD BY AUTOMATED COUNT: 13 % (ref 11.9–14.6)
HCT VFR BLD AUTO: 38.6 % (ref 35.8–46.3)
HGB BLD-MCNC: 13 G/DL (ref 11.7–15.4)
IMM GRANULOCYTES # BLD AUTO: 0 K/UL (ref 0–0.5)
IMM GRANULOCYTES NFR BLD AUTO: 0 % (ref 0–5)
LYMPHOCYTES # BLD: 2.8 K/UL (ref 0.5–4.6)
LYMPHOCYTES NFR BLD: 32 % (ref 13–44)
MCH RBC QN AUTO: 31.2 PG (ref 26.1–32.9)
MCHC RBC AUTO-ENTMCNC: 33.7 G/DL (ref 31.4–35)
MCV RBC AUTO: 92.6 FL (ref 82–102)
MONOCYTES # BLD: 0.7 K/UL (ref 0.1–1.3)
MONOCYTES NFR BLD: 7 % (ref 4–12)
NEUTS SEG # BLD: 5.2 K/UL (ref 1.7–8.2)
NEUTS SEG NFR BLD: 59 % (ref 43–78)
NRBC # BLD: 0 K/UL (ref 0–0.2)
PLATELET # BLD AUTO: 237 K/UL (ref 150–450)
PMV BLD AUTO: 11.7 FL (ref 9.4–12.3)
RBC # BLD AUTO: 4.17 M/UL (ref 4.05–5.2)
WBC # BLD AUTO: 8.8 K/UL (ref 4.3–11.1)

## 2022-12-20 PROCEDURE — 73600 X-RAY EXAM OF ANKLE: CPT

## 2022-12-20 PROCEDURE — 99203 OFFICE O/P NEW LOW 30 MIN: CPT | Performed by: NURSE PRACTITIONER

## 2022-12-20 ASSESSMENT — PATIENT HEALTH QUESTIONNAIRE - PHQ9
SUM OF ALL RESPONSES TO PHQ QUESTIONS 1-9: 0
1. LITTLE INTEREST OR PLEASURE IN DOING THINGS: 0
SUM OF ALL RESPONSES TO PHQ9 QUESTIONS 1 & 2: 0
SUM OF ALL RESPONSES TO PHQ QUESTIONS 1-9: 0
2. FEELING DOWN, DEPRESSED OR HOPELESS: 0

## 2022-12-20 ASSESSMENT — ENCOUNTER SYMPTOMS
EYES NEGATIVE: 1
GASTROINTESTINAL NEGATIVE: 1
RESPIRATORY NEGATIVE: 1
ALLERGIC/IMMUNOLOGIC NEGATIVE: 1

## 2022-12-20 NOTE — PROGRESS NOTES
[address]  [address] [place] [address], [address] [facility]   (ph) [phone] (fax) [phone]  Yulia SELBY, FNP-C      Chief Complaint   Patient presents with    Foot Swelling     Bilateral foot swelling for about 8 days, pain level 6       43 yo female comes in c/o bilateral feet swelling. Gained 1 lb since . She is under care of cardiology and denies a hx of CHF. She has not been using her compression stockings and reports occasional SOB; however,not often. She recently noticed a knot on her left ankle and reports it is tender to touch.     Foot Swelling       Allergies   Allergen Reactions    Lorazepam Itching and Swelling       Past Medical History:   Diagnosis Date    Anxiety     GERD (gastroesophageal reflux disease)     GERD (gastroesophageal reflux disease)     Miscarriage     at 4 weeks       Family History   Problem Relation Age of Onset    Hypertension Maternal Grandmother     No Known Problems Sister     No Known Problems Brother     No Known Problems Brother     Hypertension Mother     No Known Problems Brother     No Known Problems Sister     Cancer Father     Prostate Cancer Father     No Known Problems Brother        Social History     Socioeconomic History    Marital status:      Spouse name: Not on file    Number of children: Not on file    Years of education: Not on file    Highest education level: Not on file   Occupational History    Not on file   Tobacco Use    Smoking status: Former     Packs/day: 1.00     Types: Cigarettes     Quit date: 2016     Years since quittin.1    Smokeless tobacco: Never   Vaping Use    Vaping Use: Never used   Substance and Sexual Activity    Alcohol use: Not Currently    Drug use: Not Currently     Types: Cocaine    Sexual activity: Yes     Birth control/protection: Surgical, None   Other Topics Concern    Not on file   Social History Narrative    Not on file     Social Determinants of Health Financial Resource Strain: Not on file   Food Insecurity: Not on file   Transportation Needs: Not on file   Physical Activity: Not on file   Stress: Not on file   Social Connections: Not on file   Intimate Partner Violence: Not on file   Housing Stability: Not on file       OB History    No obstetric history on file. Past Surgical History:   Procedure Laterality Date    DILATION AND CURETTAGE OF UTERUS      GYN      D&C    HYSTERECTOMY (CERVIX STATUS UNKNOWN)  03/2020    TUBAL LIGATION         Health Maintenance   Topic Date Due    Varicella vaccine (1 of 2 - 2-dose childhood series) Never done    HIV screen  Never done    DTaP/Tdap/Td vaccine (6 - Tdap) 02/03/2001    COVID-19 Vaccine (3 - Booster for Pfizer series) 08/18/2022    Depression Monitoring  12/20/2023    Diabetes screen  03/04/2025    Hib vaccine  Completed    Flu vaccine  Completed    Hepatitis C screen  Completed    Hepatitis A vaccine  Aged Out    Meningococcal (ACWY) vaccine  Aged Out    Pneumococcal 0-64 years Vaccine  Aged Out         Current Outpatient Medications:     naproxen (NAPROSYN) 500 MG tablet, Take 1 tablet by mouth 2 times daily (Patient not taking: Reported on 9/14/2022), Disp: 30 tablet, Rfl: 0    omeprazole (PRILOSEC) 40 MG delayed release capsule, Take 1 capsule by mouth daily, Disp: 30 capsule, Rfl: 2    tiZANidine (ZANAFLEX) 2 MG tablet, Take 1 tablet by mouth nightly as needed (spasms), Disp: 10 tablet, Rfl: 0    clonazePAM (KLONOPIN) 1 MG tablet, Take 1 tablet by mouth daily as needed for Anxiety for up to 90 days. , Disp: 30 tablet, Rfl: 2    Review of Systems   Constitutional: Negative. HENT: Negative. Eyes: Negative. Respiratory: Negative. Cardiovascular:  Positive for leg swelling. Gastrointestinal: Negative. Endocrine: Negative. Genitourinary: Negative. Musculoskeletal:         Left ankle pain   Skin: Negative. Allergic/Immunologic: Negative. Neurological: Negative. Hematological: Negative. Psychiatric/Behavioral: Negative. Vitals:    12/20/22 1136   BP: 102/73   Pulse: 91   Temp: 98.8 °F (37.1 °C)   SpO2: 96%        Physical Exam  Constitutional:       Appearance: Normal appearance. HENT:      Head: Normocephalic. Nose: Nose normal.   Eyes:      Extraocular Movements: Extraocular movements intact. Pupils: Pupils are equal, round, and reactive to light. Cardiovascular:      Rate and Rhythm: Normal rate and regular rhythm. Pulmonary:      Effort: Pulmonary effort is normal.      Breath sounds: Normal breath sounds. Abdominal:      General: Abdomen is flat. Palpations: Abdomen is soft. Musculoskeletal:         General: Tenderness (left ankle) present. Normal range of motion. Cervical back: Normal range of motion and neck supple. Right lower leg: Edema (1+) present. Left lower leg: Edema (1+) present. Skin:     General: Skin is warm and dry. Neurological:      General: No focal deficit present. Mental Status: She is alert and oriented to person, place, and time. Psychiatric:         Mood and Affect: Mood normal.         Behavior: Behavior normal.        PHQ-9=0      Assessment & Plan:    1. Edema, unspecified type  Stable; compression stockings encouraged  - Comprehensive Metabolic Panel; Future    2. Left ankle swelling  - XR ANKLE LEFT (2 VIEWS); Future    3. Other fatigue  - CBC with Auto Differential; Future    4. Dyspnea, unspecified type  - Brain Natriuretic Peptide; Future      Greater than 50% counseling and/or coordination of care: the treatment regimen is extensive; detailed review. Will notify of labs. F/U in 1 week. This note was dictated using dragon voice recognition software. It has been proofread, but there may still exist voice recognition errors that the author did not detect.       Signed By: BAL Clemons - CNP     December 20, 2022

## 2022-12-21 DIAGNOSIS — R79.89 ELEVATED SERUM CREATININE: Primary | ICD-10-CM

## 2022-12-21 LAB
ALBUMIN SERPL-MCNC: 3.5 G/DL (ref 3.5–5)
ALBUMIN/GLOB SERPL: 0.9 (ref 0.4–1.6)
ALP SERPL-CCNC: 93 U/L (ref 50–136)
ALT SERPL-CCNC: 20 U/L (ref 12–65)
ANION GAP SERPL CALC-SCNC: 9 MMOL/L (ref 2–11)
AST SERPL-CCNC: 16 U/L (ref 15–37)
BILIRUB SERPL-MCNC: 0.3 MG/DL (ref 0.2–1.1)
BUN SERPL-MCNC: 14 MG/DL (ref 6–23)
CALCIUM SERPL-MCNC: 9.2 MG/DL (ref 8.3–10.4)
CHLORIDE SERPL-SCNC: 111 MMOL/L (ref 101–110)
CO2 SERPL-SCNC: 20 MMOL/L (ref 21–32)
CREAT SERPL-MCNC: 1.2 MG/DL (ref 0.6–1)
GLOBULIN SER CALC-MCNC: 4.1 G/DL (ref 2.8–4.5)
GLUCOSE SERPL-MCNC: 105 MG/DL (ref 65–100)
NT PRO BNP: 18 PG/ML (ref 5–125)
POTASSIUM SERPL-SCNC: 4.2 MMOL/L (ref 3.5–5.1)
PROT SERPL-MCNC: 7.6 G/DL (ref 6.3–8.2)
SODIUM SERPL-SCNC: 140 MMOL/L (ref 133–143)

## 2022-12-21 NOTE — RESULT ENCOUNTER NOTE
Results to patient please. BNP was normal.  Glucose is 105. Creatinine was 1.20. Please encourage to avoid NSAIDs and remain hydrated. Recheck in 2 to 3 weeks.   Liver enzymes normal.  Keep scheduled follow-up

## 2022-12-23 RX ORDER — OMEPRAZOLE 40 MG/1
CAPSULE, DELAYED RELEASE ORAL
Qty: 30 CAPSULE | Refills: 2 | Status: SHIPPED | OUTPATIENT
Start: 2022-12-23

## 2023-01-13 DIAGNOSIS — R79.89 ELEVATED SERUM CREATININE: ICD-10-CM

## 2023-01-13 LAB
ALBUMIN SERPL-MCNC: 3.3 G/DL (ref 3.5–5)
ALBUMIN/GLOB SERPL: 0.9 (ref 0.4–1.6)
ALP SERPL-CCNC: 87 U/L (ref 50–136)
ALT SERPL-CCNC: 21 U/L (ref 12–65)
ANION GAP SERPL CALC-SCNC: 5 MMOL/L (ref 2–11)
AST SERPL-CCNC: 16 U/L (ref 15–37)
BILIRUB SERPL-MCNC: 0.6 MG/DL (ref 0.2–1.1)
BUN SERPL-MCNC: 10 MG/DL (ref 6–23)
CALCIUM SERPL-MCNC: 9.8 MG/DL (ref 8.3–10.4)
CHLORIDE SERPL-SCNC: 108 MMOL/L (ref 101–110)
CO2 SERPL-SCNC: 26 MMOL/L (ref 21–32)
CREAT SERPL-MCNC: 0.8 MG/DL (ref 0.6–1)
GLOBULIN SER CALC-MCNC: 3.8 G/DL (ref 2.8–4.5)
GLUCOSE SERPL-MCNC: 103 MG/DL (ref 65–100)
POTASSIUM SERPL-SCNC: 3.8 MMOL/L (ref 3.5–5.1)
PROT SERPL-MCNC: 7.1 G/DL (ref 6.3–8.2)
SODIUM SERPL-SCNC: 139 MMOL/L (ref 133–143)

## 2023-01-18 ENCOUNTER — OFFICE VISIT (OUTPATIENT)
Dept: FAMILY MEDICINE CLINIC | Facility: CLINIC | Age: 38
End: 2023-01-18
Payer: COMMERCIAL

## 2023-01-18 VITALS
DIASTOLIC BLOOD PRESSURE: 77 MMHG | HEIGHT: 63 IN | SYSTOLIC BLOOD PRESSURE: 114 MMHG | HEART RATE: 92 BPM | OXYGEN SATURATION: 93 % | BODY MASS INDEX: 37.17 KG/M2 | TEMPERATURE: 98.3 F | WEIGHT: 209.8 LBS

## 2023-01-18 DIAGNOSIS — M25.572 ACUTE LEFT ANKLE PAIN: Primary | ICD-10-CM

## 2023-01-18 DIAGNOSIS — K21.9 GASTROESOPHAGEAL REFLUX DISEASE WITHOUT ESOPHAGITIS: ICD-10-CM

## 2023-01-18 PROCEDURE — 99213 OFFICE O/P EST LOW 20 MIN: CPT | Performed by: NURSE PRACTITIONER

## 2023-01-18 RX ORDER — MELOXICAM 7.5 MG/1
7.5 TABLET ORAL DAILY
Qty: 30 TABLET | Refills: 0 | Status: SHIPPED | OUTPATIENT
Start: 2023-01-18

## 2023-01-18 RX ORDER — OMEPRAZOLE 40 MG/1
40 CAPSULE, DELAYED RELEASE ORAL DAILY
Qty: 30 CAPSULE | Refills: 2 | Status: SHIPPED | OUTPATIENT
Start: 2023-01-18

## 2023-01-18 ASSESSMENT — PATIENT HEALTH QUESTIONNAIRE - PHQ9
5. POOR APPETITE OR OVEREATING: 3
1. LITTLE INTEREST OR PLEASURE IN DOING THINGS: 2
6. FEELING BAD ABOUT YOURSELF - OR THAT YOU ARE A FAILURE OR HAVE LET YOURSELF OR YOUR FAMILY DOWN: 1
9. THOUGHTS THAT YOU WOULD BE BETTER OFF DEAD, OR OF HURTING YOURSELF: 0
SUM OF ALL RESPONSES TO PHQ9 QUESTIONS 1 & 2: 4
SUM OF ALL RESPONSES TO PHQ QUESTIONS 1-9: 19
3. TROUBLE FALLING OR STAYING ASLEEP: 3
7. TROUBLE CONCENTRATING ON THINGS, SUCH AS READING THE NEWSPAPER OR WATCHING TELEVISION: 2
SUM OF ALL RESPONSES TO PHQ QUESTIONS 1-9: 19
SUM OF ALL RESPONSES TO PHQ QUESTIONS 1-9: 19
10. IF YOU CHECKED OFF ANY PROBLEMS, HOW DIFFICULT HAVE THESE PROBLEMS MADE IT FOR YOU TO DO YOUR WORK, TAKE CARE OF THINGS AT HOME, OR GET ALONG WITH OTHER PEOPLE: 1
8. MOVING OR SPEAKING SO SLOWLY THAT OTHER PEOPLE COULD HAVE NOTICED. OR THE OPPOSITE, BEING SO FIGETY OR RESTLESS THAT YOU HAVE BEEN MOVING AROUND A LOT MORE THAN USUAL: 3
4. FEELING TIRED OR HAVING LITTLE ENERGY: 3
SUM OF ALL RESPONSES TO PHQ QUESTIONS 1-9: 19
2. FEELING DOWN, DEPRESSED OR HOPELESS: 2

## 2023-01-18 ASSESSMENT — ENCOUNTER SYMPTOMS
RESPIRATORY NEGATIVE: 1
GASTROINTESTINAL NEGATIVE: 1
ALLERGIC/IMMUNOLOGIC NEGATIVE: 1
EYES NEGATIVE: 1

## 2023-01-18 NOTE — PROGRESS NOTES
[address]  [address] [facility]  [place], [address] [facility]   (ph) [phone] (fax) [phone]  Yulia SELBY, FNP-C      Chief Complaint   Patient presents with    Follow-up     4 month fu    Medication Refill       45 yo female comes in as a follow-up to left foot pain/swelling. She reports that it is worsening rather than improving. Last xray showed  mild soft tissue swelling. No acute osteoarticular process. She is under care of cardiology and denies a hx of CHF. She has not been using her compression stockings daily. She still has a  small knot on her left ankle and reports it is tender to touch; however, is improving.         Medication Refill      Allergies   Allergen Reactions    Lorazepam Itching and Swelling       Past Medical History:   Diagnosis Date    Anxiety     GERD (gastroesophageal reflux disease)     GERD (gastroesophageal reflux disease)     Miscarriage     at 4 weeks       Family History   Problem Relation Age of Onset    Hypertension Maternal Grandmother     No Known Problems Sister     No Known Problems Brother     No Known Problems Brother     Hypertension Mother     No Known Problems Brother     No Known Problems Sister     Cancer Father     Prostate Cancer Father     No Known Problems Brother        Social History     Socioeconomic History    Marital status:      Spouse name: Not on file    Number of children: Not on file    Years of education: Not on file    Highest education level: Not on file   Occupational History    Not on file   Tobacco Use    Smoking status: Former     Packs/day: 1.00     Types: Cigarettes     Quit date: 2016     Years since quittin.2    Smokeless tobacco: Never   Vaping Use    Vaping Use: Never used   Substance and Sexual Activity    Alcohol use: Not Currently    Drug use: Not Currently     Types: Cocaine    Sexual activity: Yes     Birth control/protection: Surgical, None   Other Topics Concern Not on file   Social History Narrative    Not on file     Social Determinants of Health     Financial Resource Strain: Not on file   Food Insecurity: Not on file   Transportation Needs: Not on file   Physical Activity: Not on file   Stress: Not on file   Social Connections: Not on file   Intimate Partner Violence: Not on file   Housing Stability: Not on file       OB History    No obstetric history on file. Past Surgical History:   Procedure Laterality Date    DILATION AND CURETTAGE OF UTERUS      GYN      D&C    HYSTERECTOMY (CERVIX STATUS UNKNOWN)  03/2020    TUBAL LIGATION         Health Maintenance   Topic Date Due    Varicella vaccine (1 of 2 - 2-dose childhood series) Never done    HIV screen  Never done    DTaP/Tdap/Td vaccine (6 - Tdap) 02/03/2001    COVID-19 Vaccine (3 - Booster for Pfizer series) 08/18/2022    Depression Monitoring  12/20/2023    Diabetes screen  03/04/2025    Hib vaccine  Completed    Flu vaccine  Completed    Hepatitis C screen  Completed    Hepatitis A vaccine  Aged Out    Meningococcal (ACWY) vaccine  Aged Out    Pneumococcal 0-64 years Vaccine  Aged Out         Current Outpatient Medications:     omeprazole (PRILOSEC) 40 MG delayed release capsule, Take 1 capsule by mouth daily, Disp: 30 capsule, Rfl: 2    meloxicam (MOBIC) 7.5 MG tablet, Take 1 tablet by mouth daily, Disp: 30 tablet, Rfl: 0    clonazePAM (KLONOPIN) 1 MG tablet, Take 1 tablet by mouth daily as needed for Anxiety for up to 90 days. , Disp: 30 tablet, Rfl: 2    Review of Systems   Constitutional: Negative. HENT: Negative. Eyes: Negative. Respiratory: Negative. Cardiovascular: Negative. Gastrointestinal: Negative. Endocrine: Negative. Genitourinary: Negative. Musculoskeletal:         Left ankle pain   Skin: Negative. Allergic/Immunologic: Negative. Neurological: Negative. Hematological: Negative. Psychiatric/Behavioral: Negative.         Vitals:    01/18/23 1617   BP: 114/77 Pulse: 92   Temp: 98.3 °F (36.8 °C)   SpO2: 93%        Physical Exam  Constitutional:       Appearance: Normal appearance. HENT:      Head: Normocephalic. Nose: Nose normal.   Eyes:      Extraocular Movements: Extraocular movements intact. Pupils: Pupils are equal, round, and reactive to light. Cardiovascular:      Rate and Rhythm: Normal rate and regular rhythm. Pulmonary:      Effort: Pulmonary effort is normal.      Breath sounds: Normal breath sounds. Abdominal:      General: Abdomen is flat. Palpations: Abdomen is soft. Musculoskeletal:         General: Tenderness (left ankle) present. Cervical back: Normal range of motion and neck supple. Skin:     General: Skin is warm and dry. Neurological:      General: No focal deficit present. Mental Status: She is alert and oriented to person, place, and time. Psychiatric:         Mood and Affect: Mood normal.         Behavior: Behavior normal.        PHQ-9=0      Assessment & Plan:    1. Acute left ankle pain  Pt requesting to see ortho  - meloxicam (MOBIC) 7.5 MG tablet; Take 1 tablet by mouth daily  Dispense: 30 tablet; Refill: 0  - [address] [place] [address]    2. Gastroesophageal reflux disease without esophagitis  stable  - omeprazole (PRILOSEC) 40 MG delayed release capsule; Take 1 capsule by mouth daily  Dispense: 30 capsule; Refill: 2      Greater than 50% counseling and/or coordination of care. F/U prn. This note was dictated using dragon voice recognition software. It has been proofread, but there may still exist voice recognition errors that the author did not detect.       Signed By: Joel Chacko, APRN - CNP     January 18, 2023

## 2023-02-03 ENCOUNTER — OFFICE VISIT (OUTPATIENT)
Dept: ORTHOPEDIC SURGERY | Age: 38
End: 2023-02-03

## 2023-02-03 VITALS — BODY MASS INDEX: 36.86 KG/M2 | HEIGHT: 63 IN | WEIGHT: 208 LBS

## 2023-02-03 DIAGNOSIS — M25.572 LEFT ANKLE PAIN, UNSPECIFIED CHRONICITY: Primary | ICD-10-CM

## 2023-02-03 DIAGNOSIS — M21.6X2 ACQUIRED PLANOVALGUS DEFORMITY OF LEFT FOOT: ICD-10-CM

## 2023-02-03 DIAGNOSIS — M76.822 POSTERIOR TIBIAL TENDINITIS OF LEFT LOWER EXTREMITY: ICD-10-CM

## 2023-02-03 NOTE — PROGRESS NOTES
Name: Melly Smalls  YOB: 1985  Gender: female  MRN: [id number]     CC: Left ankle pain    HPI:   She reports years of swelling and pain in her left medial ankle/foot  Late Fall 2022: She reports increased left medial ankle and foot pain  01/09/2023: Mr. Brie Thomas, [place], prescribed Medrol and PT  01/18/2023: Ms. Michael Cagle, APRN-CNP prescribed Mobic  02/03/2023: Presents with left medial ankle/foot pain. Unable to work more than 5 hours. ROS/Meds/PSH/PMH/FH/SH: reviewed today    Tobacco:  reports that she quit smoking about 6 years ago. Her smoking use included cigarettes. She smoked an average of 1 pack per day. She has never used smokeless tobacco.     Physical Examination:  Patient appears to be alert and oriented with acceptable appearance. No obvious distress or SOB  CV: appears to have acceptable vascular color and capillary refill  Neuro: appears to have mostly intact light touch sensation   Skin: Left medial PTT area soft tissue thickening  MS: Standing: Significant planovalgus: Left DSHR weak; unable to do Left SSHR: Gait protected  Left = insertional PTT pain; no ATT pain; no anterior medial ankle pain  Left = breaks with resistance PTT stress testing so really hard to assess full PTT strength    XR: Left side: Standing AP lateral mortise ankle plus AP oblique foot taken today with bunion deformity; mild tarsometatarsal arthritis; moderate planovalgus  XR Impression:  As above      Reviewed Test/Records/Documents:  12/20/2022: Left ankle x-ray [facility]: Radiologic impression: Mild soft tissue swelling. No acute osteoarticular process the x-rays of the 2 months on which she notes when doing the weekend ankle and foot on the [address] okay so then they must Synemol yet is only gotten 2 views of her there is okay  01/09/2023: Brie Thomas PA: Reflects chronic left foot pain with no injury. Off-and-on foot pain for years. Diagnosed planovalgus deformity and tendinitis. Prescribed Medrol Dosepak  01/18/2023: Mrs. Michael Cagle, APRN-CNP: Reflects left foot pain with small knot left ankle diagnosing left acute ankle pain and prescribed Mobic     Injection: Not applicable    Assessment:    Left planovalgus, posterior tibial tendinitis, mild PTT deficiency    Plan:   The patient and I discussed the above assessment. We explored treatment options. I believe her deficiency on exam relates to breaking during stress testing due to pain  Hopefully with bracing, out of work and natural medication she will see improvement  Will MRI scan to ensure no surgical pathology    Advanced medical imaging: Left ankle/hindfoot MRI scan: Assess for insertional posterior tibial tendon tearing    DME: Reparel sleeve: Lace up ankle brace  We discussed ankle care and brace protection  PT: Her co-pay was too much for her to afford PT  Orthotic/prosthetic: : More likely custom full-length insoles       Medication - OTC meds prn: Prescribed: Boswellia, Devil's claw, Turmeric-curcumin   Magne sports topical rub with frankincense and myrrh      Surgical discussion: We spent time today discussing potential outcome of surgery. Discussed that due to her planovalgus, isolated PTT reconstruction is not an option without planovalgus reconstruction. We will know more after MRI scan  Follow up: After MRI scan  Work status: Out of work 4 weeks    This note was created using Dragon voice recognition software which may result in errors of speech and spelling recognition and word/phrase syntax errors.

## 2023-02-03 NOTE — LETTER
[facility]  [address] [place] [id number]  Phone: [phone]  Fax: [phone]    Jackson Humphreys MD        February 3, 2023     Patient: Robbi Mars   YOB: 1985   Date of Visit: 2/3/2023       To Whom It May Concern: It is my medical opinion that Jamar Penn Work Status : Out of work for 4 weeks. If you have any questions or concerns, please don't hesitate to call.     Sincerely,        Jackson Humphreys MD

## 2023-02-03 NOTE — LETTER
DME Patient Authorization Form    Name: Brandee Gaspar  : 1985  MRN: [id number]   Age: 44 y.o. Gender: female  Delivery Address: [facility]     Diagnosis:     ICD-10-CM    1. Left ankle pain, unspecified chronicity  M25.572 XR ANKLE LEFT (MIN 3 VIEWS)     XR FOOT LEFT (2 VIEWS)     Wraptor Ankle Brace ()     Reparel Ankle Sleeve ()      2. Posterior tibial tendinitis of left lower extremity  M76.822 Wraptor Ankle Brace ()     Reparel Ankle Sleeve ()      3. Acquired planovalgus deformity of left foot  M21.6X2 Wraptor Ankle Brace ()     Reparel Ankle Sleeve ()           Requested DME:  Reparel Ankle Sleeve**AANKL ($30) X 1 - left  Wraptor Ankle Brace - -70 ($129.00) X 1 - left        Clinical Notes:     **Indicates non-covered items by insurance. Payment expected on date of service. Electronically signed by  Provider: Fany Mohr MD__Date: 2/3/2023                            [facility] Tax ID # [id number]        Durable Medical Equipment and/or Orthotics Patient Consent     I understand that my physician has prescribed this medical supply as part of my treatment plan as a matter of Medical Necessity.  I understand that I have a choice in where I receive my prescribed orthopedic supplies and/or services.  I authorize [facility] to furnish this service/product and to provide my insurance carrier with any information requested in order to process for payment.  I instruct my insurance carrier to pay [facility] directly for these services/products.  I understand that my insurance carrier may deny payment for this supply because it is a non-covered item, deemed not medically necessary or considered experimental.   I understand that any cost not covered by my insurance carrier will be solely my financial responsibility.    I have received the Supplier Standards and have reviewed them.  I have received the prescribed item and have been fully instructed on the proper use of the above services/products.    ______ (Patient Initials) I understand that all DME items are non-returnable after being dispensed. Items still in sealed packaging may be returned up to 14 days after purchasing. [address] will replace items that are defective.    ______ (Patient Initials) I understand that [facility] will not file a claim with my insurance carrier for this service/product and I am waiving my right to file a claim on my own for this service/product with my insurance company as this item is NON-COVERED (Denoted by the **) by my Insurance company/policy. ______ (Patient Initials) I understand that I am responsible to bring my equipment to the hospital for any surgery. ______________________________________________  ________________________  Patient / Alton Patiño            Thank you for considering [address] Your physician has prescribed specific medical equipment or devices for your home use. The following describes your rights and responsibilities as our customer. Right to Choose Providers: You have a choice regarding which company supplies your home medical equipment and devices, and to consult your physician in this decision. You may choose a medical supply store, a home medical equipment provider, or a specialist such as [facility]. [facility] will coordinate with your physician to provide the medical equipment or devices prescribed for your home use. Right to Service:  You have the right to considerate, respectful and nondiscriminatory care. You have the right to receive accurate and easily understood information about your health care.   If you speak a foreign language, or don't understand the discussions, assistance will be provided to allow you to make informed health care decisions. You have the right to know your treatment options and to participate in decisions about your care, including the right to accept or refuse treatment. You have the right to expect a reasonable response to your requests for treatment or service. You have the right to talk in confidence with health care providers and to have your health care information protected. You have the right to receive an explanation of your bill. You have the right to complain about the service or product you receive. Patient Responsibilities:  Please provide complete and accurate information about your health insurance benefits and make arrangements for the timely payment of your bill. [facility] will, if possible, assume responsibility for billing your insurance (Medicare, Medicaid and commercial) for the prescribed equipment or devices. If your policy does not cover the prescribed product, or only covers a portion of the bill, you are responsible for any remaining balance. Return and Exchange Policy:  [facility] will honor published  Warranties for products. [facility] will accept returns or exchanges within 14 days from the date of receipt, providin) the product must be in new condition; 2) receipt as required; and 3) used disposable and hygiene products may only be returned due to a defective product. Note: Refunds will be issued in a timely manner, please allow 4-6 weeks for processing. Complaint Procedures and DME Consumer Protection Resources:  [facility] values you as a customer, and is committed to resolving patient concerns. This commitment includes understanding and documenting your concerns, conducting a review of internal procedures, and providing you with an explanation and resolution to your concerns.   Should you have any questions about our services or billing process, please contact our office at (practice phone number). If we are unable to resolve the concern, you have the right to direct comments to the office of Consumer Protection, in the [address] or the [facility] office, without fear of repercussion. DMEPOS SUPPLIER STANDARDS    A supplier must be in compliance with all applicable Federal and [facility] and regulatory requirements. A supplier must provide complete and accurate information on the DMEPOS supplier application. Any changes to this information must be reported to the [facility] [employer] within 30 days. An authorized individual (one whose signature is binding) must sign the application for billing privileges. A supplier must fill orders from its own inventory, or must contract with other companies for the purchase of items necessary to fill the order. A supplier may not contract with any entity that is currently excluded from the Medicare program, any [facility] program, or from any other Federal procurement or Nonprocurement programs. A supplier must advise beneficiaries that they may rent or purchase inexpensive or routinely purchased durable medical equipment, and of the purchase option for capped rental equipment. A supplier must notify beneficiaries of warranty coverage and honor all warranties under applicable State Law, and repair or replace free of charge Medicare covered items that are under warranty. A supplier must maintain a physical facility on an appropriate site. A supplier must permit CMS, or its agents to conduct on-site inspections to ascertain the supplier's compliance with these standards. The supplier location must be accessible to beneficiaries during reasonable business hours, and must maintain a visible sign and posted hours of operation.   A supplier must maintain a primary business telephone listed under the name of the business in a [employer] or a toll free number available through directory assistance. The exclusive use of a beeper, answering machine or cell phone is prohibited. A supplier must have comprehensive liability insurance in the amount of at least $300,000 that covers both the supplier's place of business and all customers and employees of the supplier. If the supplier manufactures its own items, this insurance must also cover product liability and completed operations. A supplier must agree not to initiate telephone contact with beneficiaries, with a few exceptions allowed. This standard prohibits suppliers from calling beneficiaries in order to solicit new business. A supplier is responsible for delivery and must instruct beneficiaries on use of Medicare covered items, and maintain proof of delivery. A supplier must answer questions, and respond to complaints of the beneficiaries, and maintain documentation of such contacts. A supplier must maintain and replace at no charge or repair directly, or through a service contract with another company, Medicare covered items it has rented to beneficiaries. A supplier must accept returns of substandard (less than full quality for the particular item) or unsuitable items (inappropriate for the beneficiary at the time it was fitted and rented or sold) from beneficiaries. A supplier must disclose these supplier standards to each beneficiary to whom it supplies a Medicare-covered item. A supplier must disclose to the government any person having ownership, financial, or control interest in the supplier. A supplier must not convey or reassign a supplier number; i.e., the supplier may not sell or allow another entity to use its Medicare billing number. A supplier must have a complaint resolution protocol established to address beneficiary complaints that relate to these standards. A record of these complaints must be maintained at the physical facility.   Complaint records must include: the name, address, telephone number and health insurance claim number of the beneficiary, a summary of the complaint, and any action taken to resolve it. A supplier must agree to furnish CMS any information required by the Medicare statute and implementing regulations. A supplier of DMEPOS and other items and services must be accredited by a CMS-approved accreditation organization in order to receive and retain a supplier billing number. The accreditation must indicate the specific products and services, for which the supplier is accredited in order for the supplier to receive payment for those specific products and services. A DMEPOS supplier must notify their accreditation organization when a new DMEPOS location is opened. The accreditation organization may accredit the new supplier location for three months after it is operational without requiring a new site visit. All DMEPOS supplier locations, whether owned or subcontracted, must meet the [employer] and be separately accredited in order to bill Medicare. An accredited supplier may be denied enrollment or their enrollment may be revoked, if CMS determines that they are not in compliance with the DMEPOS quality standards. A DMEPOS supplier must disclose upon enrollment all products and services, including the addition of new product lines for which they are seeking accreditation. If a new product line is added after enrollment, the DMEPOS supplier will be responsible for notifying the accrediting body of the new product so that the DMEPOS supplier can be re-surveyed and accredited for these new products. Must meet the surety bond requirements specified in 42 C. F.R. 424.57(c). Implementation date- May 4, 2009. A supplier must obtain oxygen from a state-licensed oxygen supplier. A supplier must maintain ordering and referring documentation consistent with provisions found in 42 C. F.R. 424.516(f).   DMEPOS suppliers are prohibited from sharing a practice location with certain other Medicare providers and suppliers. DMEPOS suppliers must remain open to the public for a minimum of 30 hours per week with certain exceptions.

## 2023-02-03 NOTE — LETTER
[facility]  Arthritis oral choices: Individual Turmeric-Curcumin; Melonie Goldmann; Boswellia                           -or-   Combination [employer] Turmeric strength for joints that includes all 3 listed above     Magne topical Sports:   Balm or liquid Spray with frankincense/myrrh      Nerve medication options:   CBD, Alpha Lipoic acid, Lion's milena and any other recommended neuropathic medication            Immune/healing possibilities:  Echinacea, Elderberry    As Needed: Dead sea bath salts, Essential Oils, scar cream, Gout and cramping medications    The above list of recommended medications is only a starting point. Please allow the experts at [facility] to make the final recommendations. Before using any of these medications, please make sure that you have no concerns, senstivities or allergies to the listed ingredients in each bottle/container. Also, please check with your pharmacist or your primary care physician regarding any and all possible interactions with your other daily medications. [employer] Locations:   [address] [place] [address], [address] [place] [address]            Sincerely,      Kat Sunshine MD

## 2023-02-03 NOTE — PROGRESS NOTES
The patient was prescribed a Wraptor brace for the patient's leftfoot. The patient wears a size NA shoe and I fitted the patient with a L brace. I explained how to fit the brace properly by pulling the lace tabs across top of foot first then under arch and lastly pulling the strap up firmly and attaching to the lateral Velcro strip. Thus forming a figure 8 across the ankle joint. Once the figure 8 is completed they are to secure the top (short circumferential) straps to help avoid the straps from loosening with normal wear. The patient was able to demonstrate proper fitting in office to ensure compliance with device and acknowledged satisfaction with current fit. The patient was prescribed and fitted with a Reparel ankle sleeve for the left foot, size large. Patient read and signed documenting they understand and agree to [facility]'s current DME return policy.

## 2023-02-13 ENCOUNTER — TELEPHONE (OUTPATIENT)
Dept: ORTHOPEDIC SURGERY | Age: 38
End: 2023-02-13

## 2023-02-13 ENCOUNTER — OFFICE VISIT (OUTPATIENT)
Dept: ORTHOPEDIC SURGERY | Age: 38
End: 2023-02-13
Payer: COMMERCIAL

## 2023-02-13 VITALS — WEIGHT: 209 LBS | HEIGHT: 63 IN | BODY MASS INDEX: 37.03 KG/M2

## 2023-02-13 DIAGNOSIS — M76.822 POSTERIOR TIBIAL TENDINITIS OF LEFT LOWER EXTREMITY: ICD-10-CM

## 2023-02-13 DIAGNOSIS — M25.572 LEFT ANKLE PAIN, UNSPECIFIED CHRONICITY: Primary | ICD-10-CM

## 2023-02-13 PROCEDURE — 99213 OFFICE O/P EST LOW 20 MIN: CPT | Performed by: ORTHOPAEDIC SURGERY

## 2023-02-13 NOTE — LETTER
[facility]  [address] [place] [id number]  Phone: [phone]  Fax: [phone]    Frieda Amador MD        February 13, 2023     Patient: Evangelist Marion   YOB: 1985   Date of Visit: 2/13/2023       To Whom It May Concern: It is my medical opinion that Claire Adamson Work Status: Out of work 4 weeks. If you have any questions or concerns, please don't hesitate to call.     Sincerely,        Frieda Amador MD

## 2023-02-13 NOTE — TELEPHONE ENCOUNTER
Called and spoke with pt. Informed her we will see what the cost is with our PT and a return call will be made to her. Pt voiced understanding.

## 2023-02-13 NOTE — PROGRESS NOTES
Name: Juan Pablo Winn  YOB: 1985  Gender: female  MRN: [id number]     02/13/2023: She returns to discuss MRI scan. Feels much better. HPI:   She reports years of swelling and pain in her left medial ankle/foot  Late Fall 2022: She reports increased left medial ankle and foot pain  01/09/2023: Mr. Jamie Croft, [place], prescribed Medrol and PT  01/18/2023: Ms. Shea Childers, APRN-CNP prescribed Mobic  02/03/2023: Initial visit for: Left medial ankle/foot pain. Unable to work more than 5 hours. ROS/Meds/PSH/PMH/FH/SH: reviewed today    Tobacco:  reports that she quit smoking about 6 years ago. Her smoking use included cigarettes. She smoked an average of 1 pack per day. She has never used smokeless tobacco.     Physical Examination:  Patient appears to be alert and oriented with acceptable appearance. No obvious distress or SOB  CV: appears to have acceptable vascular color and capillary refill  Neuro: appears to have mostly intact light touch sensation   Skin: Left medial PTT area soft tissue thickening  MS: Standing: Significant planovalgus: Full left DSHR and SSHR: Gait full  Left = resolved PTT pain; resolved PTTD  Left = full ankle/foot motion; 5/5 strength; no instability or crepitance    XR: Left side: Standing AP lateral mortise ankle plus AP oblique foot taken 02/03/2023 with bunion deformity; mild tarsometatarsal arthritis; moderate planovalgus  XR Impression:  As above      Reviewed Test/Records/Documents:  12/20/2022: Left ankle x-ray [facility]: Radiologic impression: Mild soft tissue swelling. No acute osteoarticular process the x-rays of the 2 months on which she notes when doing the weekend ankle and foot on the [address] okay so then they must Synemol yet is only gotten 2 views of her there is okay  01/09/2023: Jamie Croft PA: Reflects chronic left foot pain with no injury. Off-and-on foot pain for years. Diagnosed planovalgus deformity and tendinitis.   Prescribed Medrol Dosepak  01/18/2023: Mrs. Tess Zapata, APRN-CNP: Reflects left foot pain with small knot left ankle diagnosing left acute ankle pain and prescribed Mobic     02/10/2023: Left ankle MRI scan without contrast: Radiologic impression:  1. Mild insertional posterior tibial tendinosis and tenosynovitis with subtle reactive marrow change in the navicular. No evidence of tendon tear  2. Ganglion cyst and sinus Tarsi    Injection: Not applicable    Assessment:    Left planovalgus - resolved posterior tibial tendon deficiency  Left sinus Tarsi fluid; MRI scan suggestive of ganglion cyst    Plan:   The patient and I discussed the above assessment. We explored treatment options. She is doing remarkably better  MRI scan reveals no urgent surgical pathology   I hope her tenosynovitis will resolve with conservative care    DME: Reparel sleeve: Lace up ankle brace  We discussed ankle care and Reparel sleeve/lace up ankle brace protection  PT: [facility]: PT rehabilitation for posterior tibial tendinosis/tenosynovitis. Custom full-length insoles. Please consider, per the therapist expertise, modalities such as iontophoresis, phonophoresis and dry needling    Orthotic/prosthetic: Will start with [facility] custom full-length insoles       Medication - OTC meds prn: Recommend prior prescribed: Boswellia, Devil's claw, Turmeric-curcumin   Magne sports topical rub with frankincense and myrrh      Surgical discussion: We discussed potential surgery. Discussed that due to her planovalgus, isolated PTT reconstruction is not an option without planovalgus reconstruction. No indication today based on MRI scan and resolved PTTD  Follow up: 3 weeks  Work status: Out of work 4 weeks    This note was created using Dragon voice recognition software which may result in errors of speech and spelling recognition and word/phrase syntax errors.

## 2023-02-15 DIAGNOSIS — M25.572 ACUTE LEFT ANKLE PAIN: ICD-10-CM

## 2023-02-15 RX ORDER — MELOXICAM 7.5 MG/1
7.5 TABLET ORAL DAILY
Qty: 30 TABLET | Refills: 0 | Status: SHIPPED | OUTPATIENT
Start: 2023-02-15

## 2023-02-20 ENCOUNTER — TELEPHONE (OUTPATIENT)
Dept: FAMILY MEDICINE CLINIC | Facility: CLINIC | Age: 38
End: 2023-02-20

## 2023-02-20 NOTE — TELEPHONE ENCOUNTER
Pt called stating that she has FMLA paperwork for the orthopedic doctor who put her on STD and want to know, if you will fill out the [facility] paperwork. Advised pt the provider who put you out for STD is the one who fills out the paperwork.

## 2023-02-27 ENCOUNTER — TELEPHONE (OUTPATIENT)
Dept: ORTHOPEDIC SURGERY | Age: 38
End: 2023-02-27

## 2023-03-13 ENCOUNTER — OFFICE VISIT (OUTPATIENT)
Dept: ORTHOPEDIC SURGERY | Age: 38
End: 2023-03-13

## 2023-03-13 DIAGNOSIS — M25.572 LEFT ANKLE PAIN, UNSPECIFIED CHRONICITY: Primary | ICD-10-CM

## 2023-03-13 DIAGNOSIS — M76.822 POSTERIOR TIBIAL TENDINITIS OF LEFT LOWER EXTREMITY: ICD-10-CM

## 2023-03-13 DIAGNOSIS — M76.829 PTTD (POSTERIOR TIBIAL TENDON DYSFUNCTION): ICD-10-CM

## 2023-03-13 DIAGNOSIS — M21.6X2 ACQUIRED PLANOVALGUS DEFORMITY OF LEFT FOOT: ICD-10-CM

## 2023-03-13 RX ORDER — ALBUTEROL SULFATE 90 UG/1
AEROSOL, METERED RESPIRATORY (INHALATION)
COMMUNITY
Start: 2023-03-06

## 2023-03-13 RX ORDER — NAPROXEN 500 MG/1
TABLET ORAL
COMMUNITY
Start: 2023-02-17

## 2023-03-13 RX ORDER — PREDNISONE 10 MG/1
TABLET ORAL
COMMUNITY
Start: 2023-03-06

## 2023-03-13 NOTE — PROGRESS NOTES
Name: Socrates Denny  YOB: 1985  Gender: female  MRN: [id number]     02/13/2023: She returned to discuss MRI scan. She was much better. 03/13/2023: Presents with recurrent pain and swelling. Unable to stand more than an hour without pain. No PT due to expense    HPI:   She reports years of swelling and pain in her left medial ankle/foot  Late Fall 2022: She reports increased left medial ankle and foot pain  01/09/2023: Mr. Baljeet López, [place], prescribed Medrol and PT  01/18/2023: Ms. Tara Felder, APRN-CNP prescribed Mobic  02/03/2023: Initial visit for: Left medial ankle/foot pain. Unable to work more than 5 hours. ROS/Meds/PSH/PMH/FH/SH: reviewed today    Tobacco:  reports that she quit smoking about 6 years ago. Her smoking use included cigarettes. She smoked an average of 1 pack per day. She has never used smokeless tobacco.     Physical Examination:  Patient appears to be alert and oriented with acceptable appearance. No obvious distress or SOB  CV: appears to have acceptable vascular color and capillary refill  Neuro: appears to have mostly intact light touch sensation   Skin: Left medial PTT area soft tissue thickening  MS: Standing: Significant planovalgus: Protected left DSHR and SSHR: Gait protected  Left = medial ankle to navicular tuberosity pain; PTT 4/5 strength  Left = full ankle/foot motion; remaining tendons 5/5 strength; no instability or crepitance    XR: Left side: Standing AP lateral mortise ankle plus AP oblique foot taken 02/03/2023 with bunion deformity; mild tarsometatarsal arthritis; moderate planovalgus  XR Impression:  As above      Reviewed Test/Records/Documents:  12/20/2022: Left ankle x-ray [facility]: Radiologic impression: Mild soft tissue swelling.   No acute osteoarticular process the x-rays of the 2 months on which she notes when doing the weekend ankle and foot on the [place] so then they must Synemol yet is only gotten 2 views of her there is irena  01/09/2023: Sofi Drake PA: Reflects chronic left foot pain with no injury. Off-and-on foot pain for years. Diagnosed planovalgus deformity and tendinitis. Prescribed Medrol Dosepak  01/18/2023: Mrs. Vaishnavi Da Silva, APRN-CNP: Reflects left foot pain with small knot left ankle diagnosing left acute ankle pain and prescribed Mobic     02/10/2023: Left ankle MRI scan without contrast: Radiologic impression:  1. Mild insertional posterior tibial tendinosis and tenosynovitis with subtle reactive marrow change in the navicular. No evidence of tendon tear  2. Ganglion cyst and sinus Tarsi    Injection: Not applicable    Assessment:    Left planovalgus - recurrent PTT, PTTD  Left sinus Tarsi fluid; MRI scan suggestive of ganglion cyst    Plan:   The patient and I discussed the above assessment. We explored treatment options. Unfortunately, her PTT and PTTD have both recurred  She and her  report that she is unable to stand for more than an hour without significant pain  Her exam is consistent with recurrent PTT and PTTD despite the MRI scan revealing no definite tear  Re-review of the MRI scan, I feel that she has a slight partial PTT tear at the navicular insertion with reactive marrow change    Due to out-of-pocket expenses, she is unable to do PT or custom insole   We discussed ankle care and  Reparel sleeve/lace up ankle brace protection    Since her PTT and PTTD recurred, I recommend surgery    Medication - OTC meds prn:  Surgical discussion: Day understands that due to my thumb conditions, the need to see a surgical partner for any surgery requiring the operating room. I outlined surgery with certain risks/complications and expected post-op course. My opined surgical recommendation and surgical discussion may not align exactly with my surgical partner's opinion; therefore, my partner's recommendation is what should be accepted and followed.      Right PTT/spring ligament reconstruction: LENO: Calcaneal osteotomy    Follow up: Surgical partner  Work status: Out of work or sitting work    History discussion of management: Male     This note was created using Dragon voice recognition software which may result in errors of speech and spelling recognition and word/phrase syntax errors.

## 2023-03-23 DIAGNOSIS — K21.9 GASTROESOPHAGEAL REFLUX DISEASE WITHOUT ESOPHAGITIS: ICD-10-CM

## 2023-03-23 RX ORDER — OMEPRAZOLE 40 MG/1
CAPSULE, DELAYED RELEASE ORAL
Qty: 30 CAPSULE | Refills: 2 | OUTPATIENT
Start: 2023-03-23

## 2023-03-29 RX ORDER — ALBUTEROL SULFATE 2.5 MG/3ML
SOLUTION RESPIRATORY (INHALATION)
COMMUNITY
Start: 2023-03-06

## 2023-03-29 RX ORDER — AZITHROMYCIN 250 MG/1
TABLET, FILM COATED ORAL
COMMUNITY
Start: 2023-03-06

## 2023-03-29 RX ORDER — METHYLPREDNISOLONE 4 MG/1
TABLET ORAL
COMMUNITY
Start: 2023-01-09

## 2023-03-29 RX ORDER — ARIPIPRAZOLE 5 MG/1
5 TABLET ORAL DAILY
COMMUNITY
Start: 2023-02-08

## 2023-03-29 RX ORDER — CLONAZEPAM 2 MG/1
2 TABLET ORAL 3 TIMES DAILY
COMMUNITY
Start: 2023-02-08

## 2023-03-29 RX ORDER — ALPRAZOLAM 2 MG/1
2 TABLET ORAL 2 TIMES DAILY
COMMUNITY
Start: 2023-03-09

## 2023-03-29 RX ORDER — HYDROCHLOROTHIAZIDE 12.5 MG/1
CAPSULE, GELATIN COATED ORAL
COMMUNITY
Start: 2023-01-11

## 2023-03-29 RX ORDER — ARIPIPRAZOLE 10 MG/1
10 TABLET ORAL DAILY
COMMUNITY
Start: 2023-03-09

## 2023-03-29 RX ORDER — BUPROPION HYDROCHLORIDE 150 MG/1
150 TABLET ORAL EVERY MORNING
COMMUNITY
Start: 2023-02-08

## 2023-03-29 RX ORDER — BUPROPION HYDROCHLORIDE 300 MG/1
300 TABLET ORAL EVERY MORNING
COMMUNITY
Start: 2023-03-09

## 2023-03-30 ENCOUNTER — OFFICE VISIT (OUTPATIENT)
Dept: ORTHOPEDIC SURGERY | Age: 38
End: 2023-03-30

## 2023-03-30 DIAGNOSIS — M79.672 LEFT FOOT PAIN: Primary | ICD-10-CM

## 2023-03-30 DIAGNOSIS — F17.200 SMOKING: ICD-10-CM

## 2023-03-30 DIAGNOSIS — R52 PAIN: ICD-10-CM

## 2023-03-30 NOTE — PROGRESS NOTES
insertional posterior tibial tendinosis and tenosynovitis with subtle  reactive marrow change in the navicular. No evidence of tendon tear, however. 2. Ganglion cyst in the sinus tarsi. I reviewed this MRI as well. There is some mild insertional posterior tibial tendinitis and tenosynovitis but no significant posterior tibial tendon tearing. Fluid is noted within the subtalar joint. There does appear to be a potential enthesophyte at the posterior tibial tendon. Specifically on the T1 sagittal images of the ankle. There does appear to be left foot bone. Also on the axial images 6 #18 there may be an accessory navicular which is hard and difficult to evaluate. I do not see a true synchondrosis or free-floating piece of bone to indicate true accessory navicular. I independently called and spoke with the radiologist who also agrees with my interpretation. He states that there does appear to be enthesophyte noted on the images of the posterior tibial tendon insertion navicular indicative of calcification and enthesopathy of the posterior tibial tendon attachment site. Assessment:   Posterior Tibial Tendon Insufficiency    Plan:   4 This is a chronic illness/condition with exacerbation and progression  Treatment at this time: Prescription Drug Management  Studies ordered: CT scan ordered at this Visit  At this point she vapes significant amount with high levels of nicotine. I had a long discussion with her that I want her to stop taking some of vaping. We will provide NicoDerm patches and I expressed that she will need to quit her high levels of vaping before I perform any surgery. I did discuss how this nicotine dependence will decrease her function after surgery and she has a high rate of pain. Her and her significant other both state that she will quit. After speaking with the radiologist he does think there is posterior tibial tendon inflammation and insertional disease.   He recommended a CT

## 2023-03-31 ENCOUNTER — TELEPHONE (OUTPATIENT)
Dept: ORTHOPEDIC SURGERY | Age: 38
End: 2023-03-31

## 2023-03-31 NOTE — TELEPHONE ENCOUNTER
She states she is supposed to have an MRI but she would have to pay $800 and Dr. Kourtney Baires already did an MRI. Also her medications aren't at the pharmacy. Please call.

## 2023-04-20 DIAGNOSIS — K21.9 GASTROESOPHAGEAL REFLUX DISEASE WITHOUT ESOPHAGITIS: ICD-10-CM

## 2023-04-24 RX ORDER — OMEPRAZOLE 40 MG/1
CAPSULE, DELAYED RELEASE ORAL
Qty: 30 CAPSULE | Refills: 2 | OUTPATIENT
Start: 2023-04-24

## 2023-04-25 ENCOUNTER — TELEPHONE (OUTPATIENT)
Dept: FAMILY MEDICINE CLINIC | Facility: CLINIC | Age: 38
End: 2023-04-25

## 2023-04-25 DIAGNOSIS — K21.9 GASTROESOPHAGEAL REFLUX DISEASE WITHOUT ESOPHAGITIS: ICD-10-CM

## 2023-04-25 RX ORDER — OMEPRAZOLE 40 MG/1
40 CAPSULE, DELAYED RELEASE ORAL DAILY
Qty: 30 CAPSULE | Refills: 0 | Status: SHIPPED | OUTPATIENT
Start: 2023-04-25 | End: 2023-05-24

## 2023-04-27 ENCOUNTER — OFFICE VISIT (OUTPATIENT)
Dept: ORTHOPEDIC SURGERY | Age: 38
End: 2023-04-27

## 2023-04-27 DIAGNOSIS — F17.200 SMOKING: Primary | ICD-10-CM

## 2023-04-27 DIAGNOSIS — M25.572 LEFT ANKLE PAIN, UNSPECIFIED CHRONICITY: ICD-10-CM

## 2023-04-27 RX ORDER — FLUTICASONE PROPIONATE 50 MCG
1 SPRAY, SUSPENSION (ML) NASAL DAILY
COMMUNITY
Start: 2022-07-31

## 2023-04-27 RX ORDER — PREDNISONE 10 MG/1
TABLET ORAL
COMMUNITY
Start: 2023-03-04

## 2023-04-27 RX ORDER — ALBUTEROL SULFATE 90 UG/1
2 AEROSOL, METERED RESPIRATORY (INHALATION) EVERY 4 HOURS PRN
COMMUNITY
Start: 2023-03-04

## 2023-04-27 RX ORDER — AZITHROMYCIN 250 MG/1
TABLET, FILM COATED ORAL
COMMUNITY
Start: 2023-03-04

## 2023-04-27 RX ORDER — PREDNISONE 5 MG/1
TABLET ORAL
Qty: 1 EACH | Refills: 0 | Status: SHIPPED | OUTPATIENT
Start: 2023-04-27

## 2023-04-27 NOTE — PROGRESS NOTES
Name: Robbi Mars  YOB: 1985  Gender: female  MRN: [id number]    Summary: left PTTD: Stage 2A: Tendinitis at the insertion. CT scan without signs of excessive navicular. Treatment will be a nice left, CAM Walker boot, prednisone Dosepak, PowerStep inserts for 4 weeks. We will then start physical therapy in 4 weeks. I will see her back in 8 weeks. No x-rays needed next visit     CC: Inner foot pain    HPI: Robbi Mars is a 44 y.o. female who presents with pain on the inside of the ankle/foot. The have pain with ambulation and activity. They point over the medial ankle, down the arch of the foot, and now up behind the ankle. They state it is tender and very sore. The full story is that this began multiple years ago and has years of swelling and pain in the left medial foot. And then in the fall 2022 she started to having increasing ankle and foot pain which she describes as some foot turning. It sounds as if her foot is becoming more flat. She was seen by PA in [facility] who gave her a Medrol Dosepak and she completed 6 consecutive weeks of physical therapy. The co-pay then was too much to continue therapy and she was not seeing much benefit. She was also seen by a nurse practitioner who provided her Mobic during this time. She states she got mild relief from the Medrol Dosepak but the Mobic did not help. Physical therapy was not very beneficial.  She saw Dr. Brittni Cooper on 2/3/2023. He is a nonoperative orthopedic surgeon. At this point she was unable to stand more than 5 hours much less work. She was unable to work. He treated her with some alternative medication such as frankincense, smart, turmeric. T off-the-shelf inserts for planovalgus foot were ordered and these did help some. an MRI was then ordered and she restarted physical therapy. The inserts were helpful but with time the benefit wore off.   Custom full-length inserts were

## 2023-04-27 NOTE — PROGRESS NOTES
Patient was prescribed a Night splint for the patient's left foot. The patient wears a size 7 shoe and I fitted the patient with a S size night splint. Additionally, I instructed the patient on proper use and care of device as well as ensuring patient could safely get device on and off. I explained to the patient that wearing the splint, the foot is held in a certain position, called dorsiflexion. This means that the fascia is stretched, not allowing it to contract and become tighter overnight. The great thing about a night splint is that the remedy is quite gentle and the fascia will be returned to its proper length over a period of time. Once stretched out, the plantar fascia will become less tense and therefore will cause less pain. The patient was prescribed a walker boot for the patient's left foot. The patient wears a size 7 shoe and I fitted them with a M size boot. The patient was fitted and instructed on the use of prescribed walker boot. I explained how to fit themselves and that the plastic flexible piece should always be on the front of the boot and secured by the Velcro straps on top. The air bladder in the boot was adjusted according to proper fit and comfort. The patient walked a short distance and acknowledged satisfaction with current fit. I also explained that they need a heel lift or a higher heeled shoe for the uninvolved LE to help normalize gait and avoid excessive low back stress/strain due to leg length inequality created from walker boot. Patient read and signed documenting they understand and agree to [facility]'s current DME return policy.

## 2023-05-23 DIAGNOSIS — K21.9 GASTROESOPHAGEAL REFLUX DISEASE WITHOUT ESOPHAGITIS: ICD-10-CM

## 2023-05-23 NOTE — TELEPHONE ENCOUNTER
Patient last seen 1/18/23. Omeprazole 40mg QD verified in that office note. Medication was last sent in on 4/25/23 for 30 capsules w/ 0 RF. [address] & spoke to [address], who states that this medication was last filled on 4/26/23 for 30 capsules & confirms no refills remain. Patient will be out before her appt next week. RX pended & sent to provider.

## 2023-05-24 RX ORDER — OMEPRAZOLE 40 MG/1
CAPSULE, DELAYED RELEASE ORAL
Qty: 30 CAPSULE | Refills: 0 | Status: SHIPPED | OUTPATIENT
Start: 2023-05-24

## 2023-05-30 SDOH — ECONOMIC STABILITY: FOOD INSECURITY: WITHIN THE PAST 12 MONTHS, YOU WORRIED THAT YOUR FOOD WOULD RUN OUT BEFORE YOU GOT MONEY TO BUY MORE.: SOMETIMES TRUE

## 2023-05-30 SDOH — ECONOMIC STABILITY: INCOME INSECURITY: HOW HARD IS IT FOR YOU TO PAY FOR THE VERY BASICS LIKE FOOD, HOUSING, MEDICAL CARE, AND HEATING?: HARD

## 2023-05-30 SDOH — ECONOMIC STABILITY: TRANSPORTATION INSECURITY
IN THE PAST 12 MONTHS, HAS LACK OF TRANSPORTATION KEPT YOU FROM MEETINGS, WORK, OR FROM GETTING THINGS NEEDED FOR DAILY LIVING?: NO

## 2023-05-30 SDOH — ECONOMIC STABILITY: HOUSING INSECURITY
IN THE LAST 12 MONTHS, WAS THERE A TIME WHEN YOU DID NOT HAVE A STEADY PLACE TO SLEEP OR SLEPT IN A SHELTER (INCLUDING NOW)?: NO

## 2023-05-30 SDOH — ECONOMIC STABILITY: FOOD INSECURITY: WITHIN THE PAST 12 MONTHS, THE FOOD YOU BOUGHT JUST DIDN'T LAST AND YOU DIDN'T HAVE MONEY TO GET MORE.: SOMETIMES TRUE

## 2023-05-31 ENCOUNTER — TELEMEDICINE (OUTPATIENT)
Dept: FAMILY MEDICINE CLINIC | Facility: CLINIC | Age: 38
End: 2023-05-31
Payer: COMMERCIAL

## 2023-05-31 DIAGNOSIS — K21.9 GASTROESOPHAGEAL REFLUX DISEASE WITHOUT ESOPHAGITIS: Primary | ICD-10-CM

## 2023-05-31 DIAGNOSIS — F41.9 ANXIETY: ICD-10-CM

## 2023-05-31 DIAGNOSIS — R00.2 PALPITATIONS: ICD-10-CM

## 2023-05-31 PROCEDURE — 99212 OFFICE O/P EST SF 10 MIN: CPT | Performed by: NURSE PRACTITIONER

## 2023-05-31 RX ORDER — OMEPRAZOLE 40 MG/1
40 CAPSULE, DELAYED RELEASE ORAL DAILY
Qty: 30 CAPSULE | Refills: 3 | Status: SHIPPED | OUTPATIENT
Start: 2023-05-31

## 2023-05-31 RX ORDER — ALBUTEROL SULFATE 90 UG/1
2 AEROSOL, METERED RESPIRATORY (INHALATION) EVERY 4 HOURS PRN
Qty: 18 G | Refills: 2 | Status: SHIPPED | OUTPATIENT
Start: 2023-05-31

## 2023-05-31 NOTE — PROGRESS NOTES
[address]  [address] [facility], [address] [facility]   (tv) [phone] (fax) [phone]  Yulia JENKINSKishore SELBY, FNP-C        43 yo female VV a recheck of chronic conditions. She is under care of cardiology and denies a hx of CHF. She saw Dr. Omid Bo last year for palpitations and it is time for her to go back. She feels that a lot of that is from her anxiety. She goes to mental health in [facility]. She has not been using her compression stockings and denies SOB. She is now under ortho for her ankle.      Medication Refill      Allergies   Allergen Reactions    Lorazepam Itching and Swelling       Past Medical History:   Diagnosis Date    Anxiety     GERD (gastroesophageal reflux disease)     GERD (gastroesophageal reflux disease)     Miscarriage     at 4 weeks       Family History   Problem Relation Age of Onset    Hypertension Maternal Grandmother     No Known Problems Sister     No Known Problems Brother     No Known Problems Brother     Hypertension Mother     No Known Problems Brother     No Known Problems Sister     Cancer Father     Prostate Cancer Father     No Known Problems Brother        Social History     Socioeconomic History    Marital status:      Spouse name: Not on file    Number of children: Not on file    Years of education: Not on file    Highest education level: Not on file   Occupational History    Not on file   Tobacco Use    Smoking status: Former     Packs/day: 1.00     Types: Cigarettes     Quit date: 2016     Years since quittin.5    Smokeless tobacco: Never   Vaping Use    Vaping Use: Never used   Substance and Sexual Activity    Alcohol use: Not Currently    Drug use: Not Currently     Types: Cocaine    Sexual activity: Yes     Birth control/protection: Surgical, None   Other Topics Concern    Not on file   Social History Narrative    Not on file     Social Determinants of Health     Financial Resource Strain: Not on file

## 2023-06-22 ENCOUNTER — OFFICE VISIT (OUTPATIENT)
Dept: ORTHOPEDIC SURGERY | Age: 38
End: 2023-06-22
Payer: COMMERCIAL

## 2023-06-22 DIAGNOSIS — M76.829 PTTD (POSTERIOR TIBIAL TENDON DYSFUNCTION): Primary | ICD-10-CM

## 2023-06-22 PROCEDURE — 99213 OFFICE O/P EST LOW 20 MIN: CPT | Performed by: ORTHOPAEDIC SURGERY

## 2023-06-22 RX ORDER — PREDNISONE 5 MG/1
TABLET ORAL
COMMUNITY
Start: 2023-04-27

## 2023-06-22 RX ORDER — CELECOXIB 100 MG/1
100 CAPSULE ORAL DAILY
Qty: 60 CAPSULE | Refills: 3 | Status: SHIPPED | OUTPATIENT
Start: 2023-06-22

## 2023-06-22 NOTE — PROGRESS NOTES
(DELTASONE) 5 MG tablet, FOLLOW PRINTED DIRECTIONS, Disp: , Rfl:     omeprazole (PRILOSEC) 40 MG delayed release capsule, Take 1 capsule by mouth daily, Disp: 30 capsule, Rfl: 3    albuterol sulfate HFA (PROVENTIL;VENTOLIN;PROAIR) 108 (90 Base) MCG/ACT inhaler, Inhale 2 puffs into the lungs every 4 hours as needed for Wheezing (SOB), Disp: 18 g, Rfl: 2    Multiple Vitamins-Minerals (ONE-A-DAY WOMENS PO), Take by mouth, Disp: , Rfl:     fluticasone (FLONASE) 50 MCG/ACT nasal spray, 1 spray by Nasal route daily, Disp: , Rfl:     ALPRAZolam (XANAX) 2 MG tablet, Take 2 mg by mouth 2 times daily. , Disp: , Rfl:     ARIPiprazole (ABILIFY) 10 MG tablet, Take 10 mg by mouth daily, Disp: , Rfl:     ARIPiprazole (ABILIFY) 5 MG tablet, Take 5 mg by mouth daily, Disp: , Rfl:     buPROPion (WELLBUTRIN XL) 150 MG extended release tablet, Take 150 mg by mouth every morning, Disp: , Rfl:     buPROPion (WELLBUTRIN XL) 300 MG extended release tablet, Take 300 mg by mouth every morning, Disp: , Rfl:     hydroCHLOROthiazide (MICROZIDE) 12.5 MG capsule, TAKE ONE CAPSULE (12.5 MG) DAILY AS NEEDED SWELLING, Disp: , Rfl:     albuterol (PROVENTIL) (2.5 MG/3ML) 0.083% nebulizer solution, USE 1 VIAL VIA NEBULIZER EVERY SIX (SIX) HOURS AS NEEDED FOR WHEEZING OR SHORTNESS OF BREATH FOR UP TO FIVE DAYS, Disp: , Rfl:     predniSONE (DELTASONE) 10 MG tablet, TAKE AS DIRECTED, Disp: , Rfl:     naproxen (NAPROSYN) 500 MG tablet, TAKE ONE TABLET BY MOUTH EVERY TWELVE HOURS AS NEEDED FOR MODERATE PAIN, Disp: , Rfl:     albuterol sulfate HFA (PROVENTIL;VENTOLIN;PROAIR) 108 (90 Base) MCG/ACT inhaler, INHALE TWO PUFFS EVERY 4 HOURS AS NEEDED FOR WHEEZING FOR UP TO FIVE DAYS, Disp: , Rfl:

## 2023-10-30 ENCOUNTER — HOSPITAL ENCOUNTER (EMERGENCY)
Age: 38
Discharge: HOME | End: 2023-10-30
Attending: STUDENT IN AN ORGANIZED HEALTH CARE EDUCATION/TRAINING PROGRAM

## 2023-10-30 ENCOUNTER — APPOINTMENT (OUTPATIENT)
Dept: GENERAL RADIOLOGY | Age: 38
End: 2023-10-30

## 2023-10-30 VITALS
TEMPERATURE: 98.4 F | HEIGHT: 63 IN | WEIGHT: 209 LBS | SYSTOLIC BLOOD PRESSURE: 123 MMHG | BODY MASS INDEX: 37.03 KG/M2 | HEART RATE: 65 BPM | RESPIRATION RATE: 20 BRPM | OXYGEN SATURATION: 100 % | DIASTOLIC BLOOD PRESSURE: 87 MMHG

## 2023-10-30 DIAGNOSIS — F41.1 ANXIETY STATE: Primary | ICD-10-CM

## 2023-10-30 DIAGNOSIS — F13.20 BENZODIAZEPINE DEPENDENCE (HCC): ICD-10-CM

## 2023-10-30 LAB
ALBUMIN SERPL-MCNC: 3.6 G/DL (ref 3.5–5)
ALBUMIN/GLOB SERPL: 0.8 (ref 0.4–1.6)
ALP SERPL-CCNC: 101 U/L (ref 50–136)
ALT SERPL-CCNC: 18 U/L (ref 12–65)
ANION GAP SERPL CALC-SCNC: 7 MMOL/L (ref 2–11)
AST SERPL-CCNC: 13 U/L (ref 15–37)
BASOPHILS # BLD: 0 K/UL (ref 0–0.2)
BASOPHILS NFR BLD: 1 % (ref 0–2)
BILIRUB SERPL-MCNC: 0.9 MG/DL (ref 0.2–1.1)
BUN SERPL-MCNC: 8 MG/DL (ref 6–23)
CALCIUM SERPL-MCNC: 9.2 MG/DL (ref 8.3–10.4)
CHLORIDE SERPL-SCNC: 108 MMOL/L (ref 101–110)
CO2 SERPL-SCNC: 25 MMOL/L (ref 21–32)
CREAT SERPL-MCNC: 0.9 MG/DL (ref 0.6–1)
DIFFERENTIAL METHOD BLD: NORMAL
EOSINOPHIL # BLD: 0.2 K/UL (ref 0–0.8)
EOSINOPHIL NFR BLD: 2 % (ref 0.5–7.8)
ERYTHROCYTE [DISTWIDTH] IN BLOOD BY AUTOMATED COUNT: 13.1 % (ref 11.9–14.6)
GLOBULIN SER CALC-MCNC: 4.4 G/DL (ref 2.8–4.5)
GLUCOSE SERPL-MCNC: 104 MG/DL (ref 65–100)
HCT VFR BLD AUTO: 40.9 % (ref 35.8–46.3)
HGB BLD-MCNC: 13.5 G/DL (ref 11.7–15.4)
IMM GRANULOCYTES # BLD AUTO: 0 K/UL (ref 0–0.5)
IMM GRANULOCYTES NFR BLD AUTO: 0 % (ref 0–5)
LYMPHOCYTES # BLD: 3.1 K/UL (ref 0.5–4.6)
LYMPHOCYTES NFR BLD: 37 % (ref 13–44)
MCH RBC QN AUTO: 30.6 PG (ref 26.1–32.9)
MCHC RBC AUTO-ENTMCNC: 33 G/DL (ref 31.4–35)
MCV RBC AUTO: 92.7 FL (ref 82–102)
MONOCYTES # BLD: 0.6 K/UL (ref 0.1–1.3)
MONOCYTES NFR BLD: 7 % (ref 4–12)
NEUTS SEG # BLD: 4.5 K/UL (ref 1.7–8.2)
NEUTS SEG NFR BLD: 53 % (ref 43–78)
NRBC # BLD: 0 K/UL (ref 0–0.2)
PLATELET # BLD AUTO: 255 K/UL (ref 150–450)
PMV BLD AUTO: 10.6 FL (ref 9.4–12.3)
POTASSIUM SERPL-SCNC: 3.9 MMOL/L (ref 3.5–5.1)
PROT SERPL-MCNC: 8 G/DL (ref 6.3–8.2)
RBC # BLD AUTO: 4.41 M/UL (ref 4.05–5.2)
SODIUM SERPL-SCNC: 140 MMOL/L (ref 133–143)
TROPONIN I SERPL HS-MCNC: <3 PG/ML (ref 0–14)
WBC # BLD AUTO: 8.4 K/UL (ref 4.3–11.1)

## 2023-10-30 PROCEDURE — 71045 X-RAY EXAM CHEST 1 VIEW: CPT

## 2023-10-30 PROCEDURE — 85025 COMPLETE CBC W/AUTO DIFF WBC: CPT

## 2023-10-30 PROCEDURE — 99285 EMERGENCY DEPT VISIT HI MDM: CPT

## 2023-10-30 PROCEDURE — 84484 ASSAY OF TROPONIN QUANT: CPT

## 2023-10-30 PROCEDURE — 6370000000 HC RX 637 (ALT 250 FOR IP): Performed by: STUDENT IN AN ORGANIZED HEALTH CARE EDUCATION/TRAINING PROGRAM

## 2023-10-30 PROCEDURE — 93005 ELECTROCARDIOGRAM TRACING: CPT | Performed by: STUDENT IN AN ORGANIZED HEALTH CARE EDUCATION/TRAINING PROGRAM

## 2023-10-30 PROCEDURE — 80053 COMPREHEN METABOLIC PANEL: CPT

## 2023-10-30 RX ORDER — ALPRAZOLAM 1 MG/1
1 TABLET ORAL DAILY PRN
Qty: 10 TABLET | Refills: 0 | Status: SHIPPED | OUTPATIENT
Start: 2023-10-30 | End: 2023-11-29

## 2023-10-30 RX ORDER — HYDROXYZINE PAMOATE 25 MG/1
50 CAPSULE ORAL 3 TIMES DAILY PRN
Qty: 20 CAPSULE | Refills: 0 | Status: SHIPPED | OUTPATIENT
Start: 2023-10-30 | End: 2023-11-13

## 2023-10-30 RX ORDER — ALPRAZOLAM 0.5 MG/1
1 TABLET ORAL
Status: COMPLETED | OUTPATIENT
Start: 2023-10-30 | End: 2023-10-30

## 2023-10-30 RX ADMIN — ALPRAZOLAM 1 MG: 0.5 TABLET ORAL at 14:28

## 2023-10-30 ASSESSMENT — ENCOUNTER SYMPTOMS
SHORTNESS OF BREATH: 1
CHEST TIGHTNESS: 1

## 2023-10-30 ASSESSMENT — PAIN SCALES - GENERAL: PAINLEVEL_OUTOF10: 8

## 2023-10-30 ASSESSMENT — PAIN DESCRIPTION - LOCATION: LOCATION: CHEST

## 2023-10-30 ASSESSMENT — LIFESTYLE VARIABLES
HOW MANY STANDARD DRINKS CONTAINING ALCOHOL DO YOU HAVE ON A TYPICAL DAY: PATIENT DOES NOT DRINK
HOW OFTEN DO YOU HAVE A DRINK CONTAINING ALCOHOL: NEVER

## 2023-10-30 ASSESSMENT — PAIN DESCRIPTION - FREQUENCY: FREQUENCY: CONTINUOUS

## 2023-10-30 ASSESSMENT — PAIN DESCRIPTION - DESCRIPTORS: DESCRIPTORS: TIGHTNESS

## 2023-10-30 ASSESSMENT — PAIN - FUNCTIONAL ASSESSMENT: PAIN_FUNCTIONAL_ASSESSMENT: 0-10

## 2023-10-30 ASSESSMENT — PAIN DESCRIPTION - PAIN TYPE: TYPE: ACUTE PAIN

## 2023-10-30 NOTE — DISCHARGE INSTRUCTIONS
As discussed, the ER is the inappropriate [facility] with which to refill chronic medications such as benzodiazepines. Upon review of your prescription records, you have received multiple prescriptions for this medication from various providers. You should utilize the prescription provided today only as needed for severe anxiety. Treat mild anxiety with Vistaril. Please contact your mental health provider to arrange follow-up for further evaluation and future refills of this medication.

## 2023-10-30 NOTE — ED PROVIDER NOTES
Emergency Department Provider Note       PCP: Shivani Hernandez, APRN - [facility]LV[place]   Age: 44 y.o. Sex: female     [address]    1. Anxiety state  F41.1       2. Benzodiazepine dependence ([address])  F13.20           Medical Decision Making     Complexity of Problems Addressed:  1 or more chronic illnesses with a severe exacerbation or progression. 1 or more acute illnesses that pose a threat to life or bodily function. Data Reviewed and Analyzed:  I independently ordered and reviewed each unique test.  I reviewed external records: ED visit note from an outside group. I reviewed external records: provider visit note from PCP. I reviewed external records: provider visit note from outside specialist.  I reviewed external records: PDMP aware drug database        I interpreted the X-rays no focal infiltrate. Discussion of management or test interpretation. Is a well-appearing 42-year-old female presenting this department with reports of anxiety, chest pain and shortness of breath. Patient has been seen previously on the 26th and 14th for the symptoms at local ERs. She was given 42 tablets of Xanax 1 mg during her visit on the 14th and return to the ER for more on the 26th. We discussed these visits with patient during my initial assessment. I explained to patient that we will not be providing prolonged prescription refills for these medications and that is on her to arrange follow-up with her primary care provider. I will treat her anxiety in this department and provide a short refill of her medication provided lab work, x-ray and EKG appear stable today. EKG is reassuring. Risk of Complications and/or Morbidity of Patient Management:  Prescription drug management performed. Shared medical decision making was utilized in creating the patients health plan today.     ED Course as of 10/30/23 1548   Mon Oct 30, 2023   1332 EKG interpretation: Sinus rhythm, rate of 83, normal axis, no

## 2023-10-30 NOTE — ED TRIAGE NOTES
Pt reports anxiety attack started this morning with tightness in chest and dyspnea  (+)lightheaded    A&OX4

## 2023-10-31 ENCOUNTER — CARE COORDINATION (OUTPATIENT)
Dept: CARE COORDINATION | Facility: CLINIC | Age: 38
End: 2023-10-31

## 2023-10-31 LAB
EKG ATRIAL RATE: 83 BPM
EKG DIAGNOSIS: NORMAL
EKG P AXIS: 76 DEGREES
EKG P-R INTERVAL: 168 MS
EKG Q-T INTERVAL: 376 MS
EKG QRS DURATION: 68 MS
EKG QTC CALCULATION (BAZETT): 441 MS
EKG R AXIS: 61 DEGREES
EKG T AXIS: 60 DEGREES
EKG VENTRICULAR RATE: 83 BPM

## 2023-10-31 PROCEDURE — 93010 ELECTROCARDIOGRAM REPORT: CPT | Performed by: INTERNAL MEDICINE

## 2023-10-31 NOTE — CARE COORDINATION
Ambulatory Care Management Outreach Attempt    This patient was received as a referral from  Daily assignment for case management of ed utilizer. Attempted to reach patient for ED follow up. Pt has respectfully declined services at this time, my contact information has been shared with pt in the event there circumstances change. They are free to reach out at any time. ACM signing off. Patient: Reema Fitzpatrick Patient : 1985 MRN: [id number]    Last Discharge Facility       Date Complaint Diagnosis Description Type Department Provider    10/30/23 Panic Attack; Chest Pain Anxiety state . .. ED (DISCHARGE) [facility] Deion Carry, DO                Noted following upcoming appointments from discharge chart review:   [facility] follow up appointment(s): No future appointments.   Non-[facility] follow up appointment(s):

## 2024-04-27 ENCOUNTER — APPOINTMENT (OUTPATIENT)
Dept: CT IMAGING | Age: 39
End: 2024-04-27

## 2024-04-27 ENCOUNTER — HOSPITAL ENCOUNTER (EMERGENCY)
Age: 39
Discharge: HOME | End: 2024-04-27
Attending: EMERGENCY MEDICINE

## 2024-04-27 VITALS
SYSTOLIC BLOOD PRESSURE: 111 MMHG | RESPIRATION RATE: 16 BRPM | HEIGHT: 63 IN | HEART RATE: 72 BPM | DIASTOLIC BLOOD PRESSURE: 73 MMHG | BODY MASS INDEX: 29.59 KG/M2 | TEMPERATURE: 98.2 F | WEIGHT: 167 LBS | OXYGEN SATURATION: 98 %

## 2024-04-27 DIAGNOSIS — R51.9 ACUTE NONINTRACTABLE HEADACHE, UNSPECIFIED HEADACHE TYPE: Primary | ICD-10-CM

## 2024-04-27 PROBLEM — F14.11 HISTORY OF COCAINE ABUSE (HCC): Status: ACTIVE | Noted: 2017-01-12

## 2024-04-27 LAB
ALBUMIN SERPL-MCNC: 3.1 G/DL (ref 3.5–5)
ALBUMIN/GLOB SERPL: 1 (ref 1–1.9)
ALP SERPL-CCNC: 96 U/L (ref 35–104)
ALT SERPL-CCNC: 7 U/L (ref 12–65)
ANION GAP SERPL CALC-SCNC: 10 MMOL/L (ref 9–18)
AST SERPL-CCNC: 19 U/L (ref 15–37)
BASOPHILS # BLD: 0 K/UL (ref 0–0.2)
BASOPHILS NFR BLD: 0 % (ref 0–2)
BILIRUB SERPL-MCNC: <0.2 MG/DL (ref 0–1.2)
BUN SERPL-MCNC: 7 MG/DL (ref 6–23)
CALCIUM SERPL-MCNC: 8.9 MG/DL (ref 8.8–10.2)
CHLORIDE SERPL-SCNC: 107 MMOL/L (ref 98–107)
CO2 SERPL-SCNC: 24 MMOL/L (ref 20–28)
CREAT SERPL-MCNC: 0.63 MG/DL (ref 0.6–1.1)
DIFFERENTIAL METHOD BLD: ABNORMAL
EOSINOPHIL # BLD: 0.1 K/UL (ref 0–0.8)
EOSINOPHIL NFR BLD: 1 % (ref 0.5–7.8)
ERYTHROCYTE [DISTWIDTH] IN BLOOD BY AUTOMATED COUNT: 13.2 % (ref 11.9–14.6)
GLOBULIN SER CALC-MCNC: 3.2 G/DL (ref 2.3–3.5)
GLUCOSE SERPL-MCNC: 108 MG/DL (ref 70–99)
HCT VFR BLD AUTO: 39.8 % (ref 35.8–46.3)
HGB BLD-MCNC: 13 G/DL (ref 11.7–15.4)
IMM GRANULOCYTES # BLD AUTO: 0 K/UL (ref 0–0.5)
IMM GRANULOCYTES NFR BLD AUTO: 0 % (ref 0–5)
LYMPHOCYTES # BLD: 2.6 K/UL (ref 0.5–4.6)
LYMPHOCYTES NFR BLD: 22 % (ref 13–44)
MAGNESIUM SERPL-MCNC: 1.8 MG/DL (ref 1.8–2.4)
MCH RBC QN AUTO: 31.3 PG (ref 26.1–32.9)
MCHC RBC AUTO-ENTMCNC: 32.7 G/DL (ref 31.4–35)
MCV RBC AUTO: 95.7 FL (ref 82–102)
MONOCYTES # BLD: 0.6 K/UL (ref 0.1–1.3)
MONOCYTES NFR BLD: 6 % (ref 4–12)
NEUTS SEG # BLD: 8.4 K/UL (ref 1.7–8.2)
NEUTS SEG NFR BLD: 71 % (ref 43–78)
NRBC # BLD: 0 K/UL (ref 0–0.2)
PLATELET # BLD AUTO: 299 K/UL (ref 150–450)
PMV BLD AUTO: 10.6 FL (ref 9.4–12.3)
POTASSIUM SERPL-SCNC: 3.4 MMOL/L (ref 3.5–5.1)
PROT SERPL-MCNC: 6.3 G/DL (ref 6.3–8.2)
RBC # BLD AUTO: 4.16 M/UL (ref 4.05–5.2)
SODIUM SERPL-SCNC: 140 MMOL/L (ref 136–145)
TSH W FREE THYROID IF ABNORMAL: 2.04 UIU/ML (ref 0.27–4.2)
WBC # BLD AUTO: 11.7 K/UL (ref 4.3–11.1)

## 2024-04-27 PROCEDURE — 85025 COMPLETE CBC W/AUTO DIFF WBC: CPT

## 2024-04-27 PROCEDURE — 84443 ASSAY THYROID STIM HORMONE: CPT

## 2024-04-27 PROCEDURE — 96365 THER/PROPH/DIAG IV INF INIT: CPT

## 2024-04-27 PROCEDURE — 70450 CT HEAD/BRAIN W/O DYE: CPT

## 2024-04-27 PROCEDURE — 2580000003 HC RX 258: Performed by: EMERGENCY MEDICINE

## 2024-04-27 PROCEDURE — 80053 COMPREHEN METABOLIC PANEL: CPT

## 2024-04-27 PROCEDURE — 96366 THER/PROPH/DIAG IV INF ADDON: CPT

## 2024-04-27 PROCEDURE — 6360000002 HC RX W HCPCS: Performed by: EMERGENCY MEDICINE

## 2024-04-27 PROCEDURE — 96375 TX/PRO/DX INJ NEW DRUG ADDON: CPT

## 2024-04-27 PROCEDURE — 83735 ASSAY OF MAGNESIUM: CPT

## 2024-04-27 PROCEDURE — 99284 EMERGENCY DEPT VISIT MOD MDM: CPT

## 2024-04-27 RX ORDER — DEXAMETHASONE SODIUM PHOSPHATE 10 MG/ML
10 INJECTION INTRAMUSCULAR; INTRAVENOUS
Status: COMPLETED | OUTPATIENT
Start: 2024-04-27 | End: 2024-04-27

## 2024-04-27 RX ORDER — BUTALBITAL, ACETAMINOPHEN AND CAFFEINE 300; 40; 50 MG/1; MG/1; MG/1
2 CAPSULE ORAL EVERY 6 HOURS PRN
Qty: 20 CAPSULE | Refills: 0 | Status: SHIPPED | OUTPATIENT
Start: 2024-04-27

## 2024-04-27 RX ORDER — KETOROLAC TROMETHAMINE 30 MG/ML
30 INJECTION, SOLUTION INTRAMUSCULAR; INTRAVENOUS ONCE
Status: COMPLETED | OUTPATIENT
Start: 2024-04-27 | End: 2024-04-27

## 2024-04-27 RX ORDER — MAGNESIUM SULFATE IN WATER 40 MG/ML
2000 INJECTION, SOLUTION INTRAVENOUS ONCE
Status: COMPLETED | OUTPATIENT
Start: 2024-04-27 | End: 2024-04-27

## 2024-04-27 RX ORDER — ALPRAZOLAM 2 MG/1
2 TABLET ORAL 3 TIMES DAILY
COMMUNITY
Start: 2024-04-06

## 2024-04-27 RX ORDER — 0.9 % SODIUM CHLORIDE 0.9 %
1000 INTRAVENOUS SOLUTION INTRAVENOUS
Status: COMPLETED | OUTPATIENT
Start: 2024-04-27 | End: 2024-04-27

## 2024-04-27 RX ORDER — PROCHLORPERAZINE MALEATE 10 MG
10 TABLET ORAL EVERY 6 HOURS PRN
Qty: 19 TABLET | Refills: 0 | Status: SHIPPED | OUTPATIENT
Start: 2024-04-27

## 2024-04-27 RX ORDER — METOCLOPRAMIDE HYDROCHLORIDE 5 MG/ML
10 INJECTION INTRAMUSCULAR; INTRAVENOUS
Status: COMPLETED | OUTPATIENT
Start: 2024-04-27 | End: 2024-04-27

## 2024-04-27 RX ORDER — DIPHENHYDRAMINE HYDROCHLORIDE 50 MG/ML
25 INJECTION INTRAMUSCULAR; INTRAVENOUS
Status: COMPLETED | OUTPATIENT
Start: 2024-04-27 | End: 2024-04-27

## 2024-04-27 RX ADMIN — SODIUM CHLORIDE 1000 ML: 9 INJECTION, SOLUTION INTRAVENOUS at 07:46

## 2024-04-27 RX ADMIN — MAGNESIUM SULFATE HEPTAHYDRATE 2000 MG: 40 INJECTION, SOLUTION INTRAVENOUS at 07:49

## 2024-04-27 RX ADMIN — KETOROLAC TROMETHAMINE 30 MG: 30 INJECTION, SOLUTION INTRAMUSCULAR at 07:35

## 2024-04-27 RX ADMIN — DIPHENHYDRAMINE HYDROCHLORIDE 25 MG: 50 INJECTION INTRAMUSCULAR; INTRAVENOUS at 07:35

## 2024-04-27 RX ADMIN — METOCLOPRAMIDE 10 MG: 5 INJECTION, SOLUTION INTRAMUSCULAR; INTRAVENOUS at 07:36

## 2024-04-27 RX ADMIN — DEXAMETHASONE SODIUM PHOSPHATE 10 MG: 10 INJECTION INTRAMUSCULAR; INTRAVENOUS at 07:32

## 2024-04-27 ASSESSMENT — ENCOUNTER SYMPTOMS
DIARRHEA: 0
ABDOMINAL PAIN: 0
EYE REDNESS: 0
COUGH: 0
BACK PAIN: 0
PHOTOPHOBIA: 1
NAUSEA: 1
VOMITING: 0
EYE PAIN: 0
BLURRED VISION: 1
CONSTIPATION: 0
TROUBLE SWALLOWING: 0
SINUS PAIN: 0
WHEEZING: 0
SHORTNESS OF BREATH: 0
EYE ITCHING: 0

## 2024-04-27 ASSESSMENT — PAIN - FUNCTIONAL ASSESSMENT: PAIN_FUNCTIONAL_ASSESSMENT: 0-10

## 2024-04-27 ASSESSMENT — PAIN DESCRIPTION - DESCRIPTORS: DESCRIPTORS: ACHING

## 2024-04-27 ASSESSMENT — PAIN SCALES - GENERAL: PAINLEVEL_OUTOF10: 8

## 2024-04-27 ASSESSMENT — LIFESTYLE VARIABLES
HOW MANY STANDARD DRINKS CONTAINING ALCOHOL DO YOU HAVE ON A TYPICAL DAY: 1 OR 2
HOW OFTEN DO YOU HAVE A DRINK CONTAINING ALCOHOL: MONTHLY OR LESS

## 2024-04-27 ASSESSMENT — PAIN DESCRIPTION - ORIENTATION: ORIENTATION: MID

## 2024-04-27 ASSESSMENT — PAIN DESCRIPTION - LOCATION: LOCATION: HEAD

## 2024-04-27 NOTE — DISCHARGE INSTRUCTIONS
Take medication as directed for headaches  Rest in a dark quiet room  Increase clear liquids.  I would advise no alcohol intake over the next several days, especially if you are needing to take prescription medication to help with the headaches  Call your doctor or the follow up doctor to set up appointment for recheck visit    Return to ER for any worsening symptoms or new problems which may arise

## 2024-04-27 NOTE — ED PROVIDER NOTES
Emergency Department Provider Note       PCP: Yulia Trujillo, APRN - CNP   Age: 38 y.o.   Sex: female     DISPOSITION Decision To Discharge 04/27/2024 09:19:34 AM       ICD-10-CM    1. Acute nonintractable headache, unspecified headache type  R51.9           Medical Decision Making     38-year-old female patient presents with right-sided headache  Does admit to having a few glasses of wine yesterday but nothing else that was extraordinary and not truly an excessive amount alcohol  Denies history of headache syndromes  Workup today negative including labs and CT  Blood pressure normalized here without other treatment  Hemodynamically stable feeling better after treatment will discharge symptoms could possibly be related to a migraine syndrome but otherwise fairly nonspecific  Discussed need for follow-up and return precautions     1 acute illness with systemic symptoms.  Prescription drug management performed.  Patient was discharged risks and benefits of hospitalization were considered.  Shared medical decision making was utilized in creating the patients health plan today.    I independently ordered and reviewed each unique test.  I reviewed external records: ED visit note from an outside group.  I reviewed external records: provider visit note from PCP.  I reviewed external records: provider visit note from outside specialist.     I interpreted the CT Scan CT brain revealed no evidence of acute disease process.              History     39 yo female with right sided headache.  +N/v  Denies prior ha's  Hx of anxiety  Reports sweaty palms and soles      The history is provided by the patient.   Headache  Pain location:  R parietal, R temporal and frontal  Quality:  Dull  Radiates to:  Does not radiate  Severity currently:  8/10  Severity at highest:  7/10  Onset quality:  Gradual  Timing:  Constant  Progression:  Unchanged  Chronicity:  New  Context: activity and bright light    Context: not stress, not  TSH with Reflex    POCT Urine Dipstick    POC PREGNANCY UR-QUAL        Medications given during this emergency department visit:  Medications   magnesium sulfate 2000 mg in 50 mL IVPB premix (2,000 mg IntraVENous New Bag 2490)   sodium chloride 0.9 % bolus 1,000 mL (1,000 mLs IntraVENous New Bag 2446)   dexAMETHasone (DECADRON) injection 10 mg (10 mg IntraVENous Given 24 0732)   metoclopramide (REGLAN) injection 10 mg (10 mg IntraVENous Given 2436)   diphenhydrAMINE (BENADRYL) injection 25 mg (25 mg IntraVENous Given 24)   ketorolac (TORADOL) injection 30 mg (30 mg IntraVENous Given 24)       New Prescriptions    BUTALBITAL-APAP-CAFFEINE (FIORICET) -40 MG CAPS PER CAPSULE    Take 2 capsules by mouth every 6 hours as needed for Headaches    PROCHLORPERAZINE (COMPAZINE) 10 MG TABLET    Take 1 tablet by mouth every 6 hours as needed (Take for nausea associated with headache)        Past Medical History:   Diagnosis Date    Anxiety     GERD (gastroesophageal reflux disease)     GERD (gastroesophageal reflux disease)     Miscarriage     at 4 weeks        Past Surgical History:   Procedure Laterality Date    DILATION AND CURETTAGE OF UTERUS      GYN      D&C    HYSTERECTOMY (CERVIX STATUS UNKNOWN)  2020    TUBAL LIGATION          Social History     Socioeconomic History    Marital status:    Tobacco Use    Smoking status: Former     Current packs/day: 0.00     Types: Cigarettes     Quit date: 2016     Years since quittin.4    Smokeless tobacco: Never   Vaping Use    Vaping Use: Never used   Substance and Sexual Activity    Alcohol use: Not Currently    Drug use: Not Currently     Types: Cocaine    Sexual activity: Yes     Birth control/protection: Surgical, None     Social Determinants of Health     Financial Resource Strain: Medium Risk (2022)    Overall Financial Resource Strain (CARDIA)     Difficulty of Paying Living Expenses: Somewhat

## 2024-04-27 NOTE — ED TRIAGE NOTES
Patient coming from home via EMS.  Patient reports blurry vision and headache an hour and a half.  Patient felt SOB.  EMS reports negative stroke scale.    EMS reports sinus tachy (100) and hypertension.    Patient does not take any blood pressure medications.  Alert/oriented x4

## 2024-04-27 NOTE — ED NOTES
Patient mobility status  with no difficulty. Provider aware     I have reviewed discharge instructions with the patient.  The patient verbalized understanding.    Patient left ED via Discharge Method: ambulatory to Home with Brother.    Opportunity for questions and clarification provided.     Patient given 2 scripts.            Erick Muller RN  04/27/24 0999

## 2024-04-29 ENCOUNTER — CARE COORDINATION (OUTPATIENT)
Dept: CARE COORDINATION | Facility: CLINIC | Age: 39
End: 2024-04-29

## 2024-04-29 NOTE — CARE COORDINATION
Ambulatory Care Management Outreach Attempt    This patient was received as a referral from  Daily assignment for case management of ed utilizer.    Attempted to reach patient for ED follow up. Pt has respectfully declined services at this time, my contact information has been shared with pt in the event there circumstances change. They are free to reach out at any time. ACM signing off.    Patient: Vannessa Brumfield Patient : 1985 MRN: [id number]    Last Discharge Facility       Date Complaint Diagnosis Description Type Department Provider    24 Headache Acute nonintractable headache, unspecified headache type ED (DISCHARGE) [facility] Frank Parekh MD                Noted following upcoming appointments from discharge chart review:   [facility] follow up appointment(s): No future appointments.  Non-[facility] follow up appointment(s):

## 2024-11-09 ENCOUNTER — APPOINTMENT (OUTPATIENT)
Dept: CT IMAGING | Age: 39
End: 2024-11-09
Payer: COMMERCIAL

## 2024-11-09 ENCOUNTER — HOSPITAL ENCOUNTER (EMERGENCY)
Age: 39
Discharge: HOME | End: 2024-11-09
Attending: EMERGENCY MEDICINE
Payer: COMMERCIAL

## 2024-11-09 VITALS
OXYGEN SATURATION: 99 % | SYSTOLIC BLOOD PRESSURE: 113 MMHG | HEIGHT: 63 IN | WEIGHT: 165 LBS | BODY MASS INDEX: 29.23 KG/M2 | RESPIRATION RATE: 15 BRPM | DIASTOLIC BLOOD PRESSURE: 86 MMHG | HEART RATE: 88 BPM | TEMPERATURE: 98 F

## 2024-11-09 DIAGNOSIS — S00.83XA FACIAL CONTUSION, INITIAL ENCOUNTER: Primary | ICD-10-CM

## 2024-11-09 DIAGNOSIS — S09.90XA INJURY OF HEAD, INITIAL ENCOUNTER: ICD-10-CM

## 2024-11-09 PROCEDURE — 6370000000 HC RX 637 (ALT 250 FOR IP): Performed by: EMERGENCY MEDICINE

## 2024-11-09 PROCEDURE — 72125 CT NECK SPINE W/O DYE: CPT

## 2024-11-09 PROCEDURE — 99284 EMERGENCY DEPT VISIT MOD MDM: CPT

## 2024-11-09 PROCEDURE — 70480 CT ORBIT/EAR/FOSSA W/O DYE: CPT

## 2024-11-09 PROCEDURE — 70450 CT HEAD/BRAIN W/O DYE: CPT

## 2024-11-09 RX ORDER — HYDROCODONE BITARTRATE AND ACETAMINOPHEN 5; 325 MG/1; MG/1
1 TABLET ORAL
Status: COMPLETED | OUTPATIENT
Start: 2024-11-09 | End: 2024-11-09

## 2024-11-09 RX ORDER — HYDROCODONE BITARTRATE AND ACETAMINOPHEN 7.5; 325 MG/1; MG/1
1 TABLET ORAL EVERY 6 HOURS PRN
Qty: 8 TABLET | Refills: 0 | Status: SHIPPED | OUTPATIENT
Start: 2024-11-09 | End: 2024-11-11

## 2024-11-09 RX ADMIN — HYDROCODONE BITARTRATE AND ACETAMINOPHEN 1 TABLET: 5; 325 TABLET ORAL at 20:01

## 2024-11-09 ASSESSMENT — ENCOUNTER SYMPTOMS
EYE PAIN: 1
VOMITING: 0
PHOTOPHOBIA: 1
BACK PAIN: 0
EYE REDNESS: 0
SHORTNESS OF BREATH: 0
ABDOMINAL PAIN: 0
NAUSEA: 0

## 2024-11-09 ASSESSMENT — PAIN SCALES - GENERAL
PAINLEVEL_OUTOF10: 10
PAINLEVEL_OUTOF10: 5

## 2024-11-09 ASSESSMENT — PAIN DESCRIPTION - DESCRIPTORS
DESCRIPTORS: ACHING
DESCRIPTORS: ACHING;THROBBING

## 2024-11-09 ASSESSMENT — PAIN DESCRIPTION - LOCATION
LOCATION: HEAD
LOCATION: HEAD

## 2024-11-09 ASSESSMENT — PAIN DESCRIPTION - ORIENTATION: ORIENTATION: RIGHT

## 2024-11-09 ASSESSMENT — VISUAL ACUITY: OU: 1

## 2024-11-09 ASSESSMENT — PAIN - FUNCTIONAL ASSESSMENT: PAIN_FUNCTIONAL_ASSESSMENT: 0-10
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
## 2024-11-10 NOTE — ED NOTES
Patient mobility status  with no difficulty.     I have reviewed discharge instructions with the patient.  The patient verbalized understanding.    Patient left ED via Discharge Method: ambulatory to Home with Friend.    Opportunity for questions and clarification provided.     Patient given 1 scripts.            Mary Mariee RN  11/09/24 1957

## 2025-06-07 ENCOUNTER — HOSPITAL ENCOUNTER (EMERGENCY)
Age: 40
Discharge: HOME | End: 2025-06-07
Attending: EMERGENCY MEDICINE
Payer: COMMERCIAL

## 2025-06-07 VITALS
RESPIRATION RATE: 16 BRPM | TEMPERATURE: 98.5 F | WEIGHT: 172 LBS | HEART RATE: 88 BPM | SYSTOLIC BLOOD PRESSURE: 117 MMHG | HEIGHT: 62 IN | DIASTOLIC BLOOD PRESSURE: 89 MMHG | BODY MASS INDEX: 31.65 KG/M2 | OXYGEN SATURATION: 99 %

## 2025-06-07 DIAGNOSIS — N39.0 ACUTE UTI: ICD-10-CM

## 2025-06-07 DIAGNOSIS — N89.8 VAGINAL DISCHARGE: Primary | ICD-10-CM

## 2025-06-07 LAB
APPEARANCE UR: ABNORMAL
BACTERIA URNS QL MICRO: ABNORMAL /HPF
BILIRUB UR QL: NEGATIVE
COLOR UR: ABNORMAL
EPI CELLS #/AREA URNS HPF: ABNORMAL /HPF
GLUCOSE UR STRIP.AUTO-MCNC: NEGATIVE MG/DL
HCG UR QL: NEGATIVE
HGB UR QL STRIP: NEGATIVE
KETONES UR QL STRIP.AUTO: ABNORMAL MG/DL
LEUKOCYTE ESTERASE UR QL STRIP.AUTO: ABNORMAL
MUCOUS THREADS URNS QL MICRO: ABNORMAL /LPF
NITRITE UR QL STRIP.AUTO: NEGATIVE
OTHER OBSERVATIONS: ABNORMAL
PH UR STRIP: 5.5 (ref 5–9)
PROT UR STRIP-MCNC: NEGATIVE MG/DL
RBC #/AREA URNS HPF: ABNORMAL /HPF
SP GR UR REFRACTOMETRY: 1.02 (ref 1–1.02)
UROBILINOGEN UR QL STRIP.AUTO: 1 EU/DL (ref 0.2–1)
WBC URNS QL MICRO: ABNORMAL /HPF

## 2025-06-07 PROCEDURE — 99283 EMERGENCY DEPT VISIT LOW MDM: CPT

## 2025-06-07 PROCEDURE — 6370000000 HC RX 637 (ALT 250 FOR IP): Performed by: EMERGENCY MEDICINE

## 2025-06-07 PROCEDURE — 81025 URINE PREGNANCY TEST: CPT

## 2025-06-07 PROCEDURE — 81001 URINALYSIS AUTO W/SCOPE: CPT

## 2025-06-07 RX ORDER — CEPHALEXIN 500 MG/1
500 CAPSULE ORAL
Status: COMPLETED | OUTPATIENT
Start: 2025-06-07 | End: 2025-06-07

## 2025-06-07 RX ORDER — FLUCONAZOLE 150 MG/1
150 TABLET ORAL ONCE
Qty: 1 TABLET | Refills: 0 | Status: SHIPPED | OUTPATIENT
Start: 2025-06-07 | End: 2025-06-07

## 2025-06-07 RX ORDER — CEPHALEXIN 500 MG/1
500 CAPSULE ORAL 2 TIMES DAILY
Qty: 20 CAPSULE | Refills: 0 | Status: SHIPPED | OUTPATIENT
Start: 2025-06-07 | End: 2025-06-17

## 2025-06-07 RX ORDER — FLUCONAZOLE 100 MG/1
200 TABLET ORAL ONCE
Status: COMPLETED | OUTPATIENT
Start: 2025-06-07 | End: 2025-06-07

## 2025-06-07 RX ORDER — METRONIDAZOLE 500 MG/1
500 TABLET ORAL 2 TIMES DAILY
Qty: 14 TABLET | Refills: 0 | Status: SHIPPED | OUTPATIENT
Start: 2025-06-07 | End: 2025-06-14

## 2025-06-07 RX ORDER — METRONIDAZOLE 500 MG/1
500 TABLET ORAL
Status: COMPLETED | OUTPATIENT
Start: 2025-06-07 | End: 2025-06-07

## 2025-06-07 RX ADMIN — METRONIDAZOLE 500 MG: 500 TABLET ORAL at 23:17

## 2025-06-07 RX ADMIN — FLUCONAZOLE 200 MG: 100 TABLET ORAL at 23:17

## 2025-06-07 RX ADMIN — CEPHALEXIN 500 MG: 500 CAPSULE ORAL at 23:17

## 2025-06-07 ASSESSMENT — PAIN DESCRIPTION - DESCRIPTORS: DESCRIPTORS: BURNING;ITCHING

## 2025-06-07 ASSESSMENT — LIFESTYLE VARIABLES
HOW OFTEN DO YOU HAVE A DRINK CONTAINING ALCOHOL: MONTHLY OR LESS
HOW MANY STANDARD DRINKS CONTAINING ALCOHOL DO YOU HAVE ON A TYPICAL DAY: 1 OR 2

## 2025-06-07 ASSESSMENT — PAIN DESCRIPTION - LOCATION: LOCATION: VAGINA

## 2025-06-07 ASSESSMENT — PAIN SCALES - GENERAL: PAINLEVEL_OUTOF10: 4

## 2025-06-08 NOTE — ED PROVIDER NOTES
Emergency Department Provider Note       [facility] EMERGENCY DEPT   PCP: No primary care provider on file.   Age: 39 y.o.   Sex: female     DISPOSITION Decision To Discharge 06/07/2025 11:36:13 PM    ICD-10-CM    1. Vaginal discharge  N89.8       2. Acute UTI  N39.0           Medical Decision Making     Patient comes to the ED for evaluation of vaginal discharge.  Patient states she is prone to yeast infection and getting bacterial vaginosis when she used different soaps.  Patient recently used a different soap prior to onset of symptoms.  Pt denies concern for STI, declines testing at this time.    UA with UTI. UCG negative.  Pt given Diflucan, Keflex, and Flagyl (for presumed BV) in ED.  Prescriptions written for home.  Patient denies abdominal pain, no flank pain, patient without N/V.  Patient without F/C.    Strict return precautions discussed.     1 acute, uncomplicated illness or injury.    Over the counter drug management performed.  Prescription drug management performed.  Shared medical decision making was utilized in creating the patients health plan today.    I independently ordered and reviewed each unique test.    I reviewed external records: ED visit note from a different ED.        History     Patient comes to the ED for evaluation of vaginal discharge.  Patient states she is prone to yeast infection and getting bacterial vaginosis when she used different soaps.  Patient recently used a different soap prior to onset of symptoms.  Pt denies concern for STI, declines testing at this time.      The history is provided by the patient and medical records. No  was used.     Physical Exam     Vitals signs and nursing note reviewed:  Vitals:    06/07/25 2119 06/07/25 2120   BP: 117/89    Pulse: 88    Resp: 16    Temp: 98.5 °F (36.9 °C)    SpO2: 99%    Weight:  78 kg (172 lb)   Height:  1.575 m (5' 2\")      Physical Exam  Vitals and nursing note reviewed.   Constitutional:       General: She

## 2025-06-08 NOTE — DISCHARGE INSTRUCTIONS
If you have uncontrollable vomiting, develop abdominal pain, fever, or any other concerning symptoms, please return to the ER immediately.

## 2025-06-08 NOTE — ED NOTES
3 attempts to call patient to receive discharge paperwork, no response from Munira Byrd LPN  06/07/25 [id number]

## 2025-06-08 NOTE — ED TRIAGE NOTES
Pt ambulatory to triage c/o vaginal burning and itching (x several days). Pt endorses white vaginal discharge. Denies dysuria, urinary frequency, and urgency.